# Patient Record
Sex: MALE | Race: WHITE | URBAN - METROPOLITAN AREA
[De-identification: names, ages, dates, MRNs, and addresses within clinical notes are randomized per-mention and may not be internally consistent; named-entity substitution may affect disease eponyms.]

---

## 2018-07-01 ASSESSMENT — MIFFLIN-ST. JEOR
SCORE: 1501.18
SCORE: 1501.18

## 2018-07-02 ASSESSMENT — MIFFLIN-ST. JEOR
SCORE: 1482.76
SCORE: 1482.76

## 2018-07-03 ENCOUNTER — SURGERY - HEALTHEAST (OUTPATIENT)
Dept: CARDIOLOGY | Facility: CLINIC | Age: 58
End: 2018-07-03

## 2018-07-03 ASSESSMENT — MIFFLIN-ST. JEOR
SCORE: 1496.65
SCORE: 1496.65

## 2018-07-04 ASSESSMENT — MIFFLIN-ST. JEOR
SCORE: 1492.11

## 2018-07-05 ENCOUNTER — ANESTHESIA - HEALTHEAST (OUTPATIENT)
Dept: SURGERY | Facility: CLINIC | Age: 58
End: 2018-07-05

## 2018-07-05 ASSESSMENT — MIFFLIN-ST. JEOR
SCORE: 1483.49
SCORE: 1483.49

## 2018-07-06 ENCOUNTER — SURGERY - HEALTHEAST (OUTPATIENT)
Dept: SURGERY | Facility: CLINIC | Age: 58
End: 2018-07-06

## 2018-07-06 ASSESSMENT — MIFFLIN-ST. JEOR
SCORE: 1483.49
SCORE: 1483.49

## 2018-07-07 ASSESSMENT — MIFFLIN-ST. JEOR
SCORE: 1516.15
SCORE: 1516.15

## 2018-07-08 ASSESSMENT — MIFFLIN-ST. JEOR
SCORE: 1529.76
SCORE: 1529.76

## 2018-07-09 ASSESSMENT — MIFFLIN-ST. JEOR
SCORE: 1517.97
SCORE: 1517.97

## 2018-07-10 ASSESSMENT — MIFFLIN-ST. JEOR
SCORE: 1517.51
SCORE: 1517.51

## 2018-07-11 ASSESSMENT — MIFFLIN-ST. JEOR
SCORE: 1486.67
SCORE: 1486.67

## 2018-08-10 ENCOUNTER — OFFICE VISIT - HEALTHEAST (OUTPATIENT)
Dept: CARDIOLOGY | Facility: CLINIC | Age: 58
End: 2018-08-10

## 2018-08-10 DIAGNOSIS — I48.0 PAROXYSMAL ATRIAL FIBRILLATION (H): ICD-10-CM

## 2018-08-10 DIAGNOSIS — I25.119 ATHEROSCLEROSIS OF NATIVE CORONARY ARTERY OF NATIVE HEART WITH ANGINA PECTORIS (H): ICD-10-CM

## 2018-08-10 DIAGNOSIS — I10 ESSENTIAL HYPERTENSION: ICD-10-CM

## 2018-08-10 DIAGNOSIS — Z95.1 S/P CABG X 3: ICD-10-CM

## 2018-08-21 ENCOUNTER — OFFICE VISIT - HEALTHEAST (OUTPATIENT)
Dept: CARDIOLOGY | Facility: CLINIC | Age: 58
End: 2018-08-21

## 2018-08-21 DIAGNOSIS — Z95.1 S/P CABG X 2: ICD-10-CM

## 2018-08-21 ASSESSMENT — MIFFLIN-ST. JEOR: SCORE: 1492.11

## 2018-08-24 ENCOUNTER — OFFICE VISIT - HEALTHEAST (OUTPATIENT)
Dept: CARDIOLOGY | Facility: CLINIC | Age: 58
End: 2018-08-24

## 2018-08-24 DIAGNOSIS — I10 ESSENTIAL HYPERTENSION: ICD-10-CM

## 2018-08-24 DIAGNOSIS — Z95.1 S/P CABG (CORONARY ARTERY BYPASS GRAFT): ICD-10-CM

## 2018-08-24 DIAGNOSIS — E78.00 PURE HYPERCHOLESTEROLEMIA: ICD-10-CM

## 2018-08-24 DIAGNOSIS — I73.9 PVD (PERIPHERAL VASCULAR DISEASE) (H): ICD-10-CM

## 2018-08-24 DIAGNOSIS — I25.10 CORONARY ARTERY DISEASE DUE TO CALCIFIED CORONARY LESION: ICD-10-CM

## 2018-08-24 DIAGNOSIS — I25.84 CORONARY ARTERY DISEASE DUE TO CALCIFIED CORONARY LESION: ICD-10-CM

## 2018-08-24 DIAGNOSIS — I50.32 CHRONIC DIASTOLIC HEART FAILURE (H): ICD-10-CM

## 2018-08-24 DIAGNOSIS — I48.0 PAROXYSMAL ATRIAL FIBRILLATION (H): ICD-10-CM

## 2018-08-24 ASSESSMENT — MIFFLIN-ST. JEOR: SCORE: 1492.11

## 2018-11-05 ENCOUNTER — COMMUNICATION - HEALTHEAST (OUTPATIENT)
Dept: ADMINISTRATIVE | Facility: CLINIC | Age: 58
End: 2018-11-05

## 2018-11-05 ENCOUNTER — RECORDS - HEALTHEAST (OUTPATIENT)
Dept: ADMINISTRATIVE | Facility: OTHER | Age: 58
End: 2018-11-05

## 2018-11-07 ENCOUNTER — AMBULATORY - HEALTHEAST (OUTPATIENT)
Dept: VASCULAR SURGERY | Facility: CLINIC | Age: 58
End: 2018-11-07

## 2018-11-07 DIAGNOSIS — I99.8 VASCULAR INSUFFICIENCY: ICD-10-CM

## 2018-11-08 ENCOUNTER — RECORDS - HEALTHEAST (OUTPATIENT)
Dept: ADMINISTRATIVE | Facility: OTHER | Age: 58
End: 2018-11-08

## 2018-11-09 ENCOUNTER — AMBULATORY - HEALTHEAST (OUTPATIENT)
Dept: VASCULAR SURGERY | Facility: CLINIC | Age: 58
End: 2018-11-09

## 2018-11-09 DIAGNOSIS — I73.9 PAD (PERIPHERAL ARTERY DISEASE) (H): ICD-10-CM

## 2018-11-12 ENCOUNTER — AMBULATORY - HEALTHEAST (OUTPATIENT)
Dept: VASCULAR SURGERY | Facility: CLINIC | Age: 58
End: 2018-11-12

## 2018-11-12 ENCOUNTER — RECORDS - HEALTHEAST (OUTPATIENT)
Dept: VASCULAR ULTRASOUND | Facility: CLINIC | Age: 58
End: 2018-11-12

## 2018-11-12 ENCOUNTER — OFFICE VISIT - HEALTHEAST (OUTPATIENT)
Dept: VASCULAR SURGERY | Facility: CLINIC | Age: 58
End: 2018-11-12

## 2018-11-12 DIAGNOSIS — I73.9 PERIPHERAL VASCULAR DISEASE, UNSPECIFIED (H): ICD-10-CM

## 2018-11-12 DIAGNOSIS — Z72.0 TOBACCO ABUSE: ICD-10-CM

## 2018-11-12 DIAGNOSIS — E78.5 HYPERLIPIDEMIA LDL GOAL <70: ICD-10-CM

## 2018-11-12 DIAGNOSIS — I70.229 CRITICAL LOWER LIMB ISCHEMIA (H): ICD-10-CM

## 2018-11-12 DIAGNOSIS — I73.9 PVD (PERIPHERAL VASCULAR DISEASE) (H): ICD-10-CM

## 2018-11-12 DIAGNOSIS — I73.9 PAD (PERIPHERAL ARTERY DISEASE) (H): ICD-10-CM

## 2018-11-14 ENCOUNTER — COMMUNICATION - HEALTHEAST (OUTPATIENT)
Dept: VASCULAR SURGERY | Facility: CLINIC | Age: 58
End: 2018-11-14

## 2018-11-19 ENCOUNTER — AMBULATORY - HEALTHEAST (OUTPATIENT)
Dept: VASCULAR SURGERY | Facility: CLINIC | Age: 58
End: 2018-11-19

## 2018-11-19 DIAGNOSIS — I73.9 PVD (PERIPHERAL VASCULAR DISEASE) (H): ICD-10-CM

## 2018-11-21 ENCOUNTER — RECORDS - HEALTHEAST (OUTPATIENT)
Dept: ADMINISTRATIVE | Facility: OTHER | Age: 58
End: 2018-11-21

## 2018-11-21 ENCOUNTER — COMMUNICATION - HEALTHEAST (OUTPATIENT)
Dept: VASCULAR SURGERY | Facility: CLINIC | Age: 58
End: 2018-11-21

## 2018-11-23 ENCOUNTER — COMMUNICATION - HEALTHEAST (OUTPATIENT)
Dept: VASCULAR SURGERY | Facility: CLINIC | Age: 58
End: 2018-11-23

## 2018-11-26 ENCOUNTER — APPOINTMENT (OUTPATIENT)
Dept: SURGERY | Facility: PHYSICIAN GROUP | Age: 58
End: 2018-11-26

## 2019-02-04 ENCOUNTER — OFFICE VISIT - HEALTHEAST (OUTPATIENT)
Dept: VASCULAR SURGERY | Facility: CLINIC | Age: 59
End: 2019-02-04

## 2019-02-04 ENCOUNTER — RECORDS - HEALTHEAST (OUTPATIENT)
Dept: VASCULAR ULTRASOUND | Facility: CLINIC | Age: 59
End: 2019-02-04

## 2019-02-04 DIAGNOSIS — I73.9 PVD (PERIPHERAL VASCULAR DISEASE) (H): ICD-10-CM

## 2019-02-04 DIAGNOSIS — I73.9 PERIPHERAL VASCULAR DISEASE, UNSPECIFIED (H): ICD-10-CM

## 2019-02-21 ENCOUNTER — RECORDS - HEALTHEAST (OUTPATIENT)
Dept: ADMINISTRATIVE | Facility: OTHER | Age: 59
End: 2019-02-21

## 2019-05-20 ENCOUNTER — RECORDS - HEALTHEAST (OUTPATIENT)
Dept: VASCULAR ULTRASOUND | Facility: CLINIC | Age: 59
End: 2019-05-20

## 2019-05-20 ENCOUNTER — OFFICE VISIT - HEALTHEAST (OUTPATIENT)
Dept: VASCULAR SURGERY | Facility: CLINIC | Age: 59
End: 2019-05-20

## 2019-05-20 DIAGNOSIS — I73.9 PERIPHERAL VASCULAR DISEASE, UNSPECIFIED (H): ICD-10-CM

## 2019-05-20 DIAGNOSIS — I73.9 PVD (PERIPHERAL VASCULAR DISEASE) (H): ICD-10-CM

## 2019-07-31 ENCOUNTER — HOSPITAL ENCOUNTER (INPATIENT)
Facility: CLINIC | Age: 59
LOS: 1 days | Discharge: NURSING, REHAB OR LONG-TERM CARE | DRG: 300 | End: 2019-08-07
Attending: EMERGENCY MEDICINE | Admitting: INTERNAL MEDICINE
Payer: COMMERCIAL

## 2019-07-31 DIAGNOSIS — F91.9 DISRUPTIVE BEHAVIOR: ICD-10-CM

## 2019-07-31 DIAGNOSIS — M79.672 LEFT FOOT PAIN: ICD-10-CM

## 2019-07-31 DIAGNOSIS — Z79.4 TYPE 2 DIABETES MELLITUS WITH OTHER CIRCULATORY COMPLICATION, WITH LONG-TERM CURRENT USE OF INSULIN (H): ICD-10-CM

## 2019-07-31 DIAGNOSIS — R46.89 AGGRESSIVE BEHAVIOR: ICD-10-CM

## 2019-07-31 DIAGNOSIS — E11.59 TYPE 2 DIABETES MELLITUS WITH OTHER CIRCULATORY COMPLICATION, WITH LONG-TERM CURRENT USE OF INSULIN (H): ICD-10-CM

## 2019-07-31 DIAGNOSIS — F10.920 ALCOHOLIC INTOXICATION WITHOUT COMPLICATION (H): ICD-10-CM

## 2019-07-31 DIAGNOSIS — Z72.0 TOBACCO ABUSE DISORDER: Primary | ICD-10-CM

## 2019-07-31 LAB
ALBUMIN SERPL-MCNC: 3.2 G/DL (ref 3.4–5)
ALCOHOL BREATH TEST: 0.16 (ref 0–0.01)
ALP SERPL-CCNC: 122 U/L (ref 40–150)
ALT SERPL W P-5'-P-CCNC: 52 U/L (ref 0–70)
ANION GAP SERPL CALCULATED.3IONS-SCNC: 8 MMOL/L (ref 3–14)
AST SERPL W P-5'-P-CCNC: 86 U/L (ref 0–45)
BASOPHILS # BLD AUTO: 0 10E9/L (ref 0–0.2)
BASOPHILS NFR BLD AUTO: 0.3 %
BILIRUB SERPL-MCNC: 0.2 MG/DL (ref 0.2–1.3)
BUN SERPL-MCNC: 14 MG/DL (ref 7–30)
CALCIUM SERPL-MCNC: 8.1 MG/DL (ref 8.5–10.1)
CHLORIDE SERPL-SCNC: 107 MMOL/L (ref 94–109)
CO2 SERPL-SCNC: 26 MMOL/L (ref 20–32)
CREAT SERPL-MCNC: 0.68 MG/DL (ref 0.66–1.25)
DIFFERENTIAL METHOD BLD: ABNORMAL
EOSINOPHIL # BLD AUTO: 0 10E9/L (ref 0–0.7)
EOSINOPHIL NFR BLD AUTO: 0.3 %
ERYTHROCYTE [DISTWIDTH] IN BLOOD BY AUTOMATED COUNT: 13.8 % (ref 10–15)
GFR SERPL CREATININE-BSD FRML MDRD: >90 ML/MIN/{1.73_M2}
GLUCOSE BLDC GLUCOMTR-MCNC: 105 MG/DL (ref 70–99)
GLUCOSE BLDC GLUCOMTR-MCNC: 67 MG/DL (ref 70–99)
GLUCOSE SERPL-MCNC: 127 MG/DL (ref 70–99)
HCT VFR BLD AUTO: 42.1 % (ref 40–53)
HGB BLD-MCNC: 14.7 G/DL (ref 13.3–17.7)
IMM GRANULOCYTES # BLD: 0 10E9/L (ref 0–0.4)
IMM GRANULOCYTES NFR BLD: 0.2 %
INR PPP: 2.2 (ref 0.86–1.14)
LYMPHOCYTES # BLD AUTO: 1.9 10E9/L (ref 0.8–5.3)
LYMPHOCYTES NFR BLD AUTO: 31.4 %
MCH RBC QN AUTO: 35.9 PG (ref 26.5–33)
MCHC RBC AUTO-ENTMCNC: 34.9 G/DL (ref 31.5–36.5)
MCV RBC AUTO: 103 FL (ref 78–100)
MONOCYTES # BLD AUTO: 0.3 10E9/L (ref 0–1.3)
MONOCYTES NFR BLD AUTO: 4.5 %
NEUTROPHILS # BLD AUTO: 3.8 10E9/L (ref 1.6–8.3)
NEUTROPHILS NFR BLD AUTO: 63.3 %
NRBC # BLD AUTO: 0 10*3/UL
NRBC BLD AUTO-RTO: 0 /100
PLATELET # BLD AUTO: 259 10E9/L (ref 150–450)
POTASSIUM SERPL-SCNC: 3.8 MMOL/L (ref 3.4–5.3)
PROT SERPL-MCNC: 7 G/DL (ref 6.8–8.8)
RBC # BLD AUTO: 4.1 10E12/L (ref 4.4–5.9)
SODIUM SERPL-SCNC: 141 MMOL/L (ref 133–144)
WBC # BLD AUTO: 6 10E9/L (ref 4–11)

## 2019-07-31 PROCEDURE — 25000128 H RX IP 250 OP 636: Performed by: INTERNAL MEDICINE

## 2019-07-31 PROCEDURE — 99285 EMERGENCY DEPT VISIT HI MDM: CPT

## 2019-07-31 PROCEDURE — 85610 PROTHROMBIN TIME: CPT | Performed by: INTERNAL MEDICINE

## 2019-07-31 PROCEDURE — 00000146 ZZHCL STATISTIC GLUCOSE BY METER IP

## 2019-07-31 PROCEDURE — 96374 THER/PROPH/DIAG INJ IV PUSH: CPT

## 2019-07-31 PROCEDURE — 99219 ZZC INITIAL OBSERVATION CARE,LEVL II: CPT | Performed by: INTERNAL MEDICINE

## 2019-07-31 PROCEDURE — 80053 COMPREHEN METABOLIC PANEL: CPT | Performed by: EMERGENCY MEDICINE

## 2019-07-31 PROCEDURE — 36415 COLL VENOUS BLD VENIPUNCTURE: CPT | Performed by: INTERNAL MEDICINE

## 2019-07-31 PROCEDURE — G0378 HOSPITAL OBSERVATION PER HR: HCPCS

## 2019-07-31 PROCEDURE — 25000132 ZZH RX MED GY IP 250 OP 250 PS 637: Performed by: INTERNAL MEDICINE

## 2019-07-31 PROCEDURE — 85025 COMPLETE CBC W/AUTO DIFF WBC: CPT | Performed by: EMERGENCY MEDICINE

## 2019-07-31 RX ORDER — WARFARIN SODIUM 4 MG/1
4 TABLET ORAL
Status: ON HOLD | COMMUNITY
End: 2019-08-07

## 2019-07-31 RX ORDER — ASPIRIN 81 MG/1
81 TABLET ORAL DAILY
Status: ON HOLD | COMMUNITY
End: 2019-08-07

## 2019-07-31 RX ORDER — SWAB
800 SWAB, NON-MEDICATED MISCELLANEOUS DAILY
COMMUNITY

## 2019-07-31 RX ORDER — POLYETHYLENE GLYCOL 3350 17 G/17G
17 POWDER, FOR SOLUTION ORAL DAILY PRN
Status: DISCONTINUED | OUTPATIENT
Start: 2019-07-31 | End: 2019-08-07 | Stop reason: HOSPADM

## 2019-07-31 RX ORDER — POLYETHYLENE GLYCOL 3350 17 G/17G
1 POWDER, FOR SOLUTION ORAL DAILY PRN
COMMUNITY

## 2019-07-31 RX ORDER — DEXTROSE MONOHYDRATE 25 G/50ML
25-50 INJECTION, SOLUTION INTRAVENOUS
Status: DISCONTINUED | OUTPATIENT
Start: 2019-07-31 | End: 2019-08-07 | Stop reason: HOSPADM

## 2019-07-31 RX ORDER — MAGNESIUM OXIDE 400 MG/1
1200 TABLET ORAL 2 TIMES DAILY
COMMUNITY

## 2019-07-31 RX ORDER — BUPROPION HYDROCHLORIDE 300 MG/1
300 TABLET ORAL EVERY MORNING
Status: DISCONTINUED | OUTPATIENT
Start: 2019-08-01 | End: 2019-08-07 | Stop reason: HOSPADM

## 2019-07-31 RX ORDER — ACETAMINOPHEN 325 MG/1
975 TABLET ORAL 3 TIMES DAILY
Status: DISCONTINUED | OUTPATIENT
Start: 2019-07-31 | End: 2019-08-07 | Stop reason: HOSPADM

## 2019-07-31 RX ORDER — ACETAMINOPHEN 650 MG/1
650 SUPPOSITORY RECTAL EVERY 4 HOURS PRN
Status: DISCONTINUED | OUTPATIENT
Start: 2019-07-31 | End: 2019-08-07 | Stop reason: HOSPADM

## 2019-07-31 RX ORDER — ONDANSETRON 2 MG/ML
4 INJECTION INTRAMUSCULAR; INTRAVENOUS EVERY 6 HOURS PRN
Status: DISCONTINUED | OUTPATIENT
Start: 2019-07-31 | End: 2019-08-07 | Stop reason: HOSPADM

## 2019-07-31 RX ORDER — ACETAMINOPHEN 325 MG/1
975 TABLET ORAL 3 TIMES DAILY
COMMUNITY

## 2019-07-31 RX ORDER — ONDANSETRON 4 MG/1
4 TABLET, ORALLY DISINTEGRATING ORAL EVERY 6 HOURS PRN
Status: DISCONTINUED | OUTPATIENT
Start: 2019-07-31 | End: 2019-08-07 | Stop reason: HOSPADM

## 2019-07-31 RX ORDER — BUPROPION HYDROCHLORIDE 300 MG/1
300 TABLET ORAL EVERY MORNING
COMMUNITY

## 2019-07-31 RX ORDER — NITROGLYCERIN 0.4 MG/1
0.4 TABLET SUBLINGUAL EVERY 5 MIN PRN
COMMUNITY

## 2019-07-31 RX ORDER — WARFARIN SODIUM 3 MG/1
3 TABLET ORAL
Status: ON HOLD | COMMUNITY
End: 2019-08-07

## 2019-07-31 RX ORDER — TAMSULOSIN HYDROCHLORIDE 0.4 MG/1
0.4 CAPSULE ORAL EVERY MORNING
Status: DISCONTINUED | OUTPATIENT
Start: 2019-08-01 | End: 2019-08-07 | Stop reason: HOSPADM

## 2019-07-31 RX ORDER — NALOXONE HYDROCHLORIDE 0.4 MG/ML
.1-.4 INJECTION, SOLUTION INTRAMUSCULAR; INTRAVENOUS; SUBCUTANEOUS
Status: DISCONTINUED | OUTPATIENT
Start: 2019-07-31 | End: 2019-08-07 | Stop reason: HOSPADM

## 2019-07-31 RX ORDER — AMOXICILLIN 250 MG
1 CAPSULE ORAL 2 TIMES DAILY PRN
COMMUNITY

## 2019-07-31 RX ORDER — TAMSULOSIN HYDROCHLORIDE 0.4 MG/1
0.4 CAPSULE ORAL EVERY MORNING
COMMUNITY

## 2019-07-31 RX ORDER — ACETAMINOPHEN 325 MG/1
650 TABLET ORAL EVERY 4 HOURS PRN
Status: DISCONTINUED | OUTPATIENT
Start: 2019-07-31 | End: 2019-08-07 | Stop reason: HOSPADM

## 2019-07-31 RX ORDER — GABAPENTIN 100 MG/1
CAPSULE ORAL 3 TIMES DAILY
Status: ON HOLD | COMMUNITY
End: 2019-08-07

## 2019-07-31 RX ORDER — METOPROLOL TARTRATE 25 MG/1
25 TABLET, FILM COATED ORAL 2 TIMES DAILY
Status: DISCONTINUED | OUTPATIENT
Start: 2019-07-31 | End: 2019-08-07 | Stop reason: HOSPADM

## 2019-07-31 RX ORDER — DULOXETIN HYDROCHLORIDE 60 MG/1
60 CAPSULE, DELAYED RELEASE ORAL DAILY
Status: DISCONTINUED | OUTPATIENT
Start: 2019-08-01 | End: 2019-08-07 | Stop reason: HOSPADM

## 2019-07-31 RX ORDER — DULOXETIN HYDROCHLORIDE 60 MG/1
60 CAPSULE, DELAYED RELEASE ORAL DAILY
COMMUNITY

## 2019-07-31 RX ORDER — NITROGLYCERIN 0.4 MG/1
0.4 TABLET SUBLINGUAL EVERY 5 MIN PRN
Status: DISCONTINUED | OUTPATIENT
Start: 2019-07-31 | End: 2019-08-07 | Stop reason: HOSPADM

## 2019-07-31 RX ORDER — MAGNESIUM OXIDE 400 MG/1
1200 TABLET ORAL 2 TIMES DAILY
Status: DISCONTINUED | OUTPATIENT
Start: 2019-07-31 | End: 2019-08-07 | Stop reason: HOSPADM

## 2019-07-31 RX ORDER — ASPIRIN 81 MG/1
81 TABLET ORAL DAILY
Status: DISCONTINUED | OUTPATIENT
Start: 2019-08-01 | End: 2019-08-07 | Stop reason: HOSPADM

## 2019-07-31 RX ORDER — ATORVASTATIN CALCIUM 40 MG/1
40 TABLET, FILM COATED ORAL AT BEDTIME
COMMUNITY

## 2019-07-31 RX ORDER — ATORVASTATIN CALCIUM 40 MG/1
40 TABLET, FILM COATED ORAL AT BEDTIME
Status: DISCONTINUED | OUTPATIENT
Start: 2019-07-31 | End: 2019-08-07 | Stop reason: HOSPADM

## 2019-07-31 RX ORDER — NICOTINE POLACRILEX 4 MG
15-30 LOZENGE BUCCAL
Status: DISCONTINUED | OUTPATIENT
Start: 2019-07-31 | End: 2019-08-07 | Stop reason: HOSPADM

## 2019-07-31 RX ORDER — KETOROLAC TROMETHAMINE 30 MG/ML
30 INJECTION, SOLUTION INTRAMUSCULAR; INTRAVENOUS EVERY 6 HOURS
Status: DISCONTINUED | OUTPATIENT
Start: 2019-07-31 | End: 2019-08-02

## 2019-07-31 RX ORDER — METOPROLOL TARTRATE 25 MG/1
25 TABLET, FILM COATED ORAL 2 TIMES DAILY
COMMUNITY

## 2019-07-31 RX ORDER — GABAPENTIN 100 MG/1
100 CAPSULE ORAL 3 TIMES DAILY
Status: DISCONTINUED | OUTPATIENT
Start: 2019-07-31 | End: 2019-08-03

## 2019-07-31 RX ADMIN — ATORVASTATIN CALCIUM 40 MG: 40 TABLET, FILM COATED ORAL at 23:11

## 2019-07-31 RX ADMIN — GABAPENTIN 100 MG: 100 CAPSULE ORAL at 23:11

## 2019-07-31 RX ADMIN — ACETAMINOPHEN 975 MG: 325 TABLET, FILM COATED ORAL at 23:11

## 2019-07-31 RX ADMIN — KETOROLAC TROMETHAMINE 30 MG: 30 INJECTION, SOLUTION INTRAMUSCULAR at 23:10

## 2019-07-31 RX ADMIN — METOPROLOL TARTRATE 25 MG: 25 TABLET, FILM COATED ORAL at 23:11

## 2019-07-31 ASSESSMENT — ENCOUNTER SYMPTOMS: AGITATION: 1

## 2019-07-31 NOTE — ED PROVIDER NOTES
History     Chief Complaint:  Aggressive Behavior    HPI   Placido Hinton is a 59 year old male who presents to the ED for the evaluation of aggressive behavior. The patient has lived at [facility] for the last 1.5 years. The nursing home states that the patient was throwing urinals at staff, throwing rocks at the window, and that he was threatening staff today. They called EMS. Here, the patient allegedly states that he threw the urinals at staff because they didn't empty them for 3 days.Today, the patient reports that he has been drinking alcohol. He denies a history of withdrawal issues or thoughts of hurting self or others.    Allergies:  Penicillins    Past Medical History:    Type 2 diabetes  No history of withdrawal issues    Past Surgical History:    Right above knee amputation    Family History:    Family history reviewed. No pertinent family history.     Social History:  Smoking Status: Current every day smoker  Alcohol Use: Positive  Marital Status:  Single      Review of Systems   Musculoskeletal:        Left foot pain   Psychiatric/Behavioral: Positive for agitation. Negative for suicidal ideas.        No homicidal ideas   All other systems reviewed and are negative.      Physical Exam     Patient Vitals for the past 24 hrs:   BP Temp Temp src Pulse Resp SpO2   07/31/19 2241 (!) 165/106 98  F (36.7  C) Oral 84 -- 98 %   07/31/19 2135 114/79 -- -- 101 16 97 %   07/31/19 1732 113/80 98.2  F (36.8  C) Oral 89 16 99 %      Physical Exam  General: Patient in mild distress.  Alert and cooperative with exam. Moderately intoxicated  HEENT: NC/AT. Conjunctiva without injection or scleral icterus. External ears normal.  Respiratory: Breathing comfortably on room air  CV: Normal rate, all extremities well perfused  GI:  Non-distended abdomen  Skin: Warm, dry, no rashes/open wounds on exposed skin  Musculoskeletal: Right AKA. Left great toe amputation  Neuro: Alert, answers questions appropriately. No  gross motor deficits  Psychiatric: History of alcohol abuse. Denies SI/HI    Emergency Department Course     Laboratory:  Laboratory findings were communicated with the patient who voiced understanding of the findings.    CBC: WBC 6.0, HGB 14.7,   CMP: glucose 127 (H), calcium 8.1 (L), albumin 3.2 (L), AST 86 (H), o/w WNL (creatinine: 0.68)  Alcohol breath test: 0.158 (A)    Emergency Department Course:    1720 Nursing notes and vitals reviewed.    1725 I performed an exam of the patient as documented above.     1850 IV was inserted and blood was drawn for laboratory testing, results above.     2032 I spoke with Dr. Hicks of the hospitalist service from [facility] regarding patient's presentation, findings, and plan of care.     2231 Patient admitted.    Impression & Plan      Medical Decision Making:  Patient is a 59-year-old male presents from skilled care facility for disruptive/agitated behavior and alcohol intoxication.  Patient's medical history and records were reviewed.  On evaluation, patient is calm and cooperative and without medical complaint.  He was noted to be intoxicated (alcohol breath test 0.158) and have mildly elevated AST as noted above.  Patient's abdominal exam was benign.  Patient's skilled care facility sent a letter to the emergency department stating that he would not be allowed back to the facility until approved by the facility administration; the timeline for this is unclear.  Patient will be admitted to observation with the hospitalist service pending placement.  No emergent medical conditions were identified during patient's ED encounter.     Diagnosis:    ICD-10-CM    1. Alcoholic intoxication without complication (H) F10.920 INR     Glucose by meter     Glucose by meter     Glucose by meter     Glucose by meter     Glucose by meter     Glucose by meter   2. Disruptive behavior F91.9      Disposition:   The patient is admitted into the care of Dr. Hicks.     Destiney  Disclosure:  I, Flip Mcdaniel, am serving as a scribe at 5:35 PM on 7/31/2019 to document services personally performed by William Miranda DO based on my observations and the provider's statements to me.   EMERGENCY DEPARTMENT       William Miranda DO  08/01/19 0317

## 2019-07-31 NOTE — ED NOTES
Bed: BH3  Expected date:   Expected time:   Means of arrival:   Comments:  511  59 M etoh/restrained  1700

## 2019-07-31 NOTE — ED NOTES
Alissa from [facility] called and stated the patient has been using a phone jose to have alcohol delivered to him.  Been increasingly aggressive and is not allowed back.  She states they will try finding a rehab for him tomorrow and sort out living arrangements.  Call back for Alissa [phone].  She states we can ask for her or Tonya or Andra.  I relayed this to the MD

## 2019-07-31 NOTE — ED TRIAGE NOTES
"Lives at [facility] and drunk.  Got upset and threw full urinals at staff, making threats to kill others, and throwing rocks at the windows.  PD found 2 750ml bottles of vodka under his wheelchair, and confiscated them.  Patient came in on restraints stating \"Don't fuck with me\".  I completed my assessment and he calmed down.  He stated there is a lot of negligence at the nursing home, and hadn't emptied his urinals in days.  He stated, \"My room was disgusting and smelly.  I have to live in a place where people don't care\".  He contracted for safety here and stated he will not harm people here.  He states, \"You guys are cool and I have no problems with you\".  Security and I took him out of restraints from EMS.   "

## 2019-08-01 ENCOUNTER — APPOINTMENT (OUTPATIENT)
Dept: ULTRASOUND IMAGING | Facility: CLINIC | Age: 59
DRG: 300 | End: 2019-08-01
Attending: HOSPITALIST
Payer: COMMERCIAL

## 2019-08-01 ENCOUNTER — APPOINTMENT (OUTPATIENT)
Dept: ULTRASOUND IMAGING | Facility: CLINIC | Age: 59
DRG: 300 | End: 2019-08-01
Attending: STUDENT IN AN ORGANIZED HEALTH CARE EDUCATION/TRAINING PROGRAM
Payer: COMMERCIAL

## 2019-08-01 LAB
GLUCOSE BLDC GLUCOMTR-MCNC: 124 MG/DL (ref 70–99)
GLUCOSE BLDC GLUCOMTR-MCNC: 135 MG/DL (ref 70–99)
GLUCOSE BLDC GLUCOMTR-MCNC: 161 MG/DL (ref 70–99)
GLUCOSE BLDC GLUCOMTR-MCNC: 263 MG/DL (ref 70–99)
GLUCOSE BLDC GLUCOMTR-MCNC: 48 MG/DL (ref 70–99)
GLUCOSE BLDC GLUCOMTR-MCNC: 74 MG/DL (ref 70–99)
GLUCOSE BLDC GLUCOMTR-MCNC: 82 MG/DL (ref 70–99)
GLUCOSE BLDC GLUCOMTR-MCNC: 83 MG/DL (ref 70–99)
GLUCOSE BLDC GLUCOMTR-MCNC: 98 MG/DL (ref 70–99)
GLUCOSE BLDC GLUCOMTR-MCNC: 98 MG/DL (ref 70–99)
GLUCOSE SERPL-MCNC: 69 MG/DL (ref 70–99)
INR PPP: 2.18 (ref 0.86–1.14)

## 2019-08-01 PROCEDURE — 36415 COLL VENOUS BLD VENIPUNCTURE: CPT | Performed by: INTERNAL MEDICINE

## 2019-08-01 PROCEDURE — G0378 HOSPITAL OBSERVATION PER HR: HCPCS

## 2019-08-01 PROCEDURE — 85610 PROTHROMBIN TIME: CPT | Performed by: INTERNAL MEDICINE

## 2019-08-01 PROCEDURE — 82947 ASSAY GLUCOSE BLOOD QUANT: CPT | Performed by: INTERNAL MEDICINE

## 2019-08-01 PROCEDURE — 99207 ZZC CDG-CODE CATEGORY CHANGED: CPT | Performed by: HOSPITALIST

## 2019-08-01 PROCEDURE — 96376 TX/PRO/DX INJ SAME DRUG ADON: CPT

## 2019-08-01 PROCEDURE — 25000132 ZZH RX MED GY IP 250 OP 250 PS 637: Performed by: HOSPITALIST

## 2019-08-01 PROCEDURE — 99225 ZZC SUBSEQUENT OBSERVATION CARE,LEVEL II: CPT | Performed by: HOSPITALIST

## 2019-08-01 PROCEDURE — 25000128 H RX IP 250 OP 636: Performed by: INTERNAL MEDICINE

## 2019-08-01 PROCEDURE — 25000131 ZZH RX MED GY IP 250 OP 636 PS 637: Performed by: INTERNAL MEDICINE

## 2019-08-01 PROCEDURE — 93922 UPR/L XTREMITY ART 2 LEVELS: CPT

## 2019-08-01 PROCEDURE — 25000132 ZZH RX MED GY IP 250 OP 250 PS 637: Performed by: INTERNAL MEDICINE

## 2019-08-01 PROCEDURE — 00000146 ZZHCL STATISTIC GLUCOSE BY METER IP

## 2019-08-01 PROCEDURE — 25000131 ZZH RX MED GY IP 250 OP 636 PS 637: Performed by: HOSPITALIST

## 2019-08-01 PROCEDURE — 96372 THER/PROPH/DIAG INJ SC/IM: CPT

## 2019-08-01 PROCEDURE — 93926 LOWER EXTREMITY STUDY: CPT | Mod: LT

## 2019-08-01 RX ORDER — HYDROMORPHONE HYDROCHLORIDE 1 MG/ML
.3-.5 INJECTION, SOLUTION INTRAMUSCULAR; INTRAVENOUS; SUBCUTANEOUS EVERY 4 HOURS PRN
Status: DISCONTINUED | OUTPATIENT
Start: 2019-08-01 | End: 2019-08-01

## 2019-08-01 RX ORDER — DIAZEPAM 5 MG
10 TABLET ORAL EVERY 30 MIN PRN
Status: DISCONTINUED | OUTPATIENT
Start: 2019-08-01 | End: 2019-08-04

## 2019-08-01 RX ORDER — MULTIPLE VITAMINS W/ MINERALS TAB 9MG-400MCG
1 TAB ORAL DAILY
Status: DISCONTINUED | OUTPATIENT
Start: 2019-08-01 | End: 2019-08-07 | Stop reason: HOSPADM

## 2019-08-01 RX ORDER — LANOLIN ALCOHOL/MO/W.PET/CERES
100 CREAM (GRAM) TOPICAL DAILY
Status: COMPLETED | OUTPATIENT
Start: 2019-08-01 | End: 2019-08-05

## 2019-08-01 RX ORDER — HYDRALAZINE HYDROCHLORIDE 20 MG/ML
10 INJECTION INTRAMUSCULAR; INTRAVENOUS EVERY 4 HOURS PRN
Status: DISCONTINUED | OUTPATIENT
Start: 2019-08-01 | End: 2019-08-07 | Stop reason: HOSPADM

## 2019-08-01 RX ORDER — WARFARIN SODIUM 4 MG/1
4 TABLET ORAL
Status: COMPLETED | OUTPATIENT
Start: 2019-08-01 | End: 2019-08-01

## 2019-08-01 RX ORDER — WARFARIN SODIUM 4 MG/1
4 TABLET ORAL ONCE
Status: COMPLETED | OUTPATIENT
Start: 2019-08-01 | End: 2019-08-01

## 2019-08-01 RX ORDER — DIAZEPAM 10 MG/2ML
5-10 INJECTION, SOLUTION INTRAMUSCULAR; INTRAVENOUS EVERY 30 MIN PRN
Status: DISCONTINUED | OUTPATIENT
Start: 2019-08-01 | End: 2019-08-04

## 2019-08-01 RX ORDER — FOLIC ACID 1 MG/1
1 TABLET ORAL DAILY
Status: DISCONTINUED | OUTPATIENT
Start: 2019-08-01 | End: 2019-08-07 | Stop reason: HOSPADM

## 2019-08-01 RX ADMIN — ACETAMINOPHEN 975 MG: 325 TABLET, FILM COATED ORAL at 21:33

## 2019-08-01 RX ADMIN — MULTIPLE VITAMINS W/ MINERALS TAB 1 TABLET: TAB at 14:49

## 2019-08-01 RX ADMIN — ACETAMINOPHEN 975 MG: 325 TABLET, FILM COATED ORAL at 08:01

## 2019-08-01 RX ADMIN — HYDROMORPHONE HYDROCHLORIDE 1 MG: 2 TABLET ORAL at 21:33

## 2019-08-01 RX ADMIN — TAMSULOSIN HYDROCHLORIDE 0.4 MG: 0.4 CAPSULE ORAL at 08:01

## 2019-08-01 RX ADMIN — GABAPENTIN 100 MG: 100 CAPSULE ORAL at 21:35

## 2019-08-01 RX ADMIN — WARFARIN SODIUM 4 MG: 4 TABLET ORAL at 17:38

## 2019-08-01 RX ADMIN — INSULIN ASPART 18 UNITS: 100 INJECTION, SOLUTION INTRAVENOUS; SUBCUTANEOUS at 17:43

## 2019-08-01 RX ADMIN — BUPROPION HYDROCHLORIDE 300 MG: 300 TABLET, FILM COATED, EXTENDED RELEASE ORAL at 08:01

## 2019-08-01 RX ADMIN — KETOROLAC TROMETHAMINE 30 MG: 30 INJECTION, SOLUTION INTRAMUSCULAR at 21:45

## 2019-08-01 RX ADMIN — METOPROLOL TARTRATE 25 MG: 25 TABLET, FILM COATED ORAL at 08:01

## 2019-08-01 RX ADMIN — KETOROLAC TROMETHAMINE 30 MG: 30 INJECTION, SOLUTION INTRAMUSCULAR at 16:52

## 2019-08-01 RX ADMIN — KETOROLAC TROMETHAMINE 30 MG: 30 INJECTION, SOLUTION INTRAMUSCULAR at 04:24

## 2019-08-01 RX ADMIN — METOPROLOL TARTRATE 25 MG: 25 TABLET, FILM COATED ORAL at 21:37

## 2019-08-01 RX ADMIN — GABAPENTIN 100 MG: 100 CAPSULE ORAL at 08:01

## 2019-08-01 RX ADMIN — INSULIN GLARGINE 30 UNITS: 100 INJECTION, SOLUTION SUBCUTANEOUS at 00:38

## 2019-08-01 RX ADMIN — ACETAMINOPHEN 975 MG: 325 TABLET, FILM COATED ORAL at 16:51

## 2019-08-01 RX ADMIN — MAGNESIUM OXIDE TAB 400 MG (241.3 MG ELEMENTAL MG) 1200 MG: 400 (241.3 MG) TAB at 08:01

## 2019-08-01 RX ADMIN — MAGNESIUM OXIDE TAB 400 MG (241.3 MG ELEMENTAL MG) 1200 MG: 400 (241.3 MG) TAB at 00:38

## 2019-08-01 RX ADMIN — HYDROMORPHONE HYDROCHLORIDE 1 MG: 2 TABLET ORAL at 17:24

## 2019-08-01 RX ADMIN — KETOROLAC TROMETHAMINE 30 MG: 30 INJECTION, SOLUTION INTRAMUSCULAR at 10:16

## 2019-08-01 RX ADMIN — GABAPENTIN 100 MG: 100 CAPSULE ORAL at 16:51

## 2019-08-01 RX ADMIN — HYDROMORPHONE HYDROCHLORIDE 1 MG: 2 TABLET ORAL at 13:42

## 2019-08-01 RX ADMIN — DULOXETINE HYDROCHLORIDE 60 MG: 60 CAPSULE, DELAYED RELEASE ORAL at 08:01

## 2019-08-01 RX ADMIN — Medication 100 MG: at 14:49

## 2019-08-01 RX ADMIN — ATORVASTATIN CALCIUM 40 MG: 40 TABLET, FILM COATED ORAL at 21:35

## 2019-08-01 RX ADMIN — MAGNESIUM OXIDE TAB 400 MG (241.3 MG ELEMENTAL MG) 1200 MG: 400 (241.3 MG) TAB at 21:34

## 2019-08-01 RX ADMIN — WARFARIN SODIUM 4 MG: 4 TABLET ORAL at 00:38

## 2019-08-01 RX ADMIN — ASPIRIN 81 MG: 81 TABLET, COATED ORAL at 08:01

## 2019-08-01 RX ADMIN — INSULIN GLARGINE 5 UNITS: 100 INJECTION, SOLUTION SUBCUTANEOUS at 22:42

## 2019-08-01 RX ADMIN — DEXTROSE 30 G: 15 GEL ORAL at 02:13

## 2019-08-01 RX ADMIN — FOLIC ACID 1 MG: 1 TABLET ORAL at 14:49

## 2019-08-01 NOTE — PHARMACY-ANTICOAGULATION SERVICE
Clinical Pharmacy - Warfarin Dosing Consult     Pharmacy has been consulted to manage this patient s warfarin therapy.  Indication: (vascular dz)  Therapy Goal: INR 2-3  Warfarin Prior to Admission: Yes  Warfarin PTA Regimen: PTA dose 3mg daily except 4mg on Wednesdays    INR   Date Value Ref Range Status   07/31/2019 2.20 (H) 0.86 - 1.14 Final   01/26/2006 0.89 0.86 - 1.14 Final       Recommend warfarin 4 mg today.  Pharmacy will monitor Placido Hinton daily and order warfarin doses to achieve specified goal.      Please contact pharmacy as soon as possible if the warfarin needs to be held for a procedure or if the warfarin goals change.

## 2019-08-01 NOTE — H&P
Admitted:     07/31/2019      CHIEF COMPLAINT:  Aggression and leg pain.      HISTORY OF PRESENT ILLNESS:  Mr. Hinton is a 59-year-old gentleman who was hospitalized in March of this year with a cellulitis of his lower extremity who has since been residing in an assisted care facility, who was sent in for aggression and agitation.  This history is obtained both from the patient himself and discussion with staff.  The patient has a historical alcohol dependence, but had been in remission up until earlier this year.  Reportedly, he was ordering alcohol from an online service and having it delivered to his care facility.  He said that he was doing this because they were inadequately treating his pain.  His pain involves phantom pain of his right lower extremity where there was an above-the-knee amputation as well as in his right foot where there is a partial toe amputation.  Essentially, he has been getting Tylenol over the last couple of weeks.  He had been on gabapentin, but that was tapered off for unclear reasons.  The patient was sent in essentially for uncouth behavior and the facility indicated that they would not be able to take him back tonight.  He was evaluated in the emergency room by Dr. Miranda and found to have a largely normal metabolic profile with the exception of a slight elevation of AST and a blood alcohol level of 0.158.  The patient has been hemodynamically stable and cooperative.      REVIEW OF SYSTEMS:  The patient otherwise denies shortness of breath, fevers, chills, nausea, vomiting.  Otherwise, a 10-point review of systems is negative except as noted above in the history of present illness.      PAST MEDICAL HISTORY:   1.  Bipolar disorder.   2.  History of GERD.   3.  Distant history of deep venous thrombosis.  He is on chronic anticoagulation with warfarin.   4.  Hypertension.   5.  History of type 2 diabetes.   6.  History of traumatic brain injury.   7.  History of peripheral arterial  disease.  He had a right above-the-knee amputation and a left fem-pop bypass surgery with stenting.   8.  History of coronary artery disease, status post CABG in 2018.      SOCIAL HISTORY:  The patient smokes 5-7 cigarettes daily by his own admission.  Alcohol use as described in the HPI.      MEDICATIONS PRIOR TO ADMISSION:   1.  Wellbutrin.   2.  Cymbalta.   3.  Atorvastatin.   4.  Aspirin.   5.  Glargine insulin.   6.  Metoprolol.   7.  Flomax.   8.  Warfarin.      FAMILY HISTORY:  Reviewed and noncontributory.      PHYSICAL EXAMINATION:   VITAL SIGNS:  Blood pressure 113/80, respirations 16, pulse is 89, temperature is 98.   GENERAL:  The patient is visibly intoxicated, but otherwise cooperative.   HEENT:  Sclerae are anicteric.  Pupils are round and reactive.  Tongue and uvula are midline.   NEUROLOGIC:  There is no asterixis.  Equal  strength.   HEART:  Regular rate and rhythm.  No murmurs.   LUNGS:  Clear to auscultation bilaterally.   ABDOMEN:  Soft, nontender, nondistended.   EXTREMITIES:  He has a right above-the-knee amputation well-healed.  Left lower extremity, no substantial edema.  He has right partial toe amputation also appears well-healed.      LABORATORY DATA:  Sodium 141, potassium 3.8, creatinine 0.68.  White blood cell count 6.0, hemoglobin 14.7, platelets 259.  Blood alcohol level 0.158.      ASSESSMENT AND PLAN:  This is a 59-year-old gentleman presenting with aggressive behavior in the setting of inadequate pain control and increasing alcohol consumption.   1.  Aggressive behavior.  I suspect this is likely probably a personality trait exacerbated by recent alcohol use.  The patient acknowledged that he dumped his urine out on the floor because they did not empty it fast enough at his care facility.  He also voiced displeasure with them having discontinued the gabapentin and the only thing that he is on for pain is Tylenol.  He has been obtaining alcohol through delivery service.  At  this point, he is being admitted to observation essentially for safe disposition.   2.  Alcohol use.  I had a long discussion with the patient about this.  He was a former drinker and has increased his alcohol consumption rapidly over the last couple of weeks for this reason.  He does have signs of mild hepatitis with an elevated AST.  He denies a history of alcohol withdrawal and he is currently intoxicated, but not critically so.  I suggested speaking with a chemical dependency counselor or psychiatrist, however, the patient refuses.  At this point, he is competent to make his own decisions.   3.  Chronic pain.  The patient reported that Dilaudid was effective for him.  He is now only on Tylenol.  He has also previously been on gabapentin, which was tapered down for unclear reasons.  At this point, I will restart gabapentin and continue Tylenol.  I will give 2 scheduled doses of Toradol, although acknowledged that this is not a good long-term option.  I would like to avoid opiates in a patient that likely already has chemical dependency issues.   4.  History of DVT and peripheral vascular disease.  The patient is typically maintained on warfarin.  INR has not been checked.  We will have pharmacy dose warfarin.   5.  History of type 2 diabetes.  Last hemoglobin A1c in the 10-11 range.  We will continue his outpatient insulin schedule in addition to sliding scale insulin.      DISPOSITION:  The patient will be admitted to observation as he essentially needs a safe disposition at that point.  He is likely at risk for alcohol withdrawal, currently intoxicated..  He denies a history of it.  I will defer starting the alcohol withdrawal protocol at this point, but could reconsider pending his clinical course.  At this point, he currently refuses CD or psychiatric evaluation.      TOTAL TIME SPENT:  Greater than 50% of which involved counseling and coordination of care, is 60 minutes.         BHARGAV GU MD              D: 2019   T: 2019   MT: MARIZOL      Name:     RUTHY NATH   MRN:      [id number]        Account:      [id number]   :      1960        Admitted:     2019                   Document: [id number]

## 2019-08-01 NOTE — PROGRESS NOTES
Called re: insulin dose  Had episode of hypoglycemia this evening, given juice with improvement  Supposed to get 52 units of lantus  Not reliable po right now  Will give 30 units lantus this evening then resume PTA tomorrow evening    Michoacano Love M.D.  Hospitalist  Pager [phone]  Text Page

## 2019-08-01 NOTE — PROVIDER NOTIFICATION
Updated Hospitalist regarding pt  after giving 3 cups of orange juice. Hold 18 units of insulin for now per order.

## 2019-08-01 NOTE — PHARMACY-ADMISSION MEDICATION HISTORY
Admission medication history interview status for the 7/31/2019  admission is complete. See EPIC admission navigator for prior to admission medications     Medication history source reliability:Good    Actions taken by pharmacist (provider contacted, etc): Reviewed med list sent by the [facility].  They said he has refused all medications today but they said he had them yesterday.      Additional medication history information not noted on PTA med list :None     Medication reconciliation/reorder completed by provider prior to medication history? No    Time spent in this activity: 20 mins    Prior to Admission medications    Medication Sig Last Dose Taking? Auth Provider   acetaminophen (TYLENOL) 325 MG tablet Take 975 mg by mouth 3 times daily 7/30/2019 at Unknown time Yes Unknown, Entered By History   aspirin 81 MG EC tablet Take 81 mg by mouth daily 7/30/2019 at Unknown time Yes Unknown, Entered By History   atorvastatin (LIPITOR) 40 MG tablet Take 40 mg by mouth At Bedtime 7/30/2019 at Unknown time Yes Unknown, Entered By History   buPROPion (WELLBUTRIN XL) 300 MG 24 hr tablet Take 300 mg by mouth every morning 7/30/2019 at Unknown time Yes Unknown, Entered By History   DULoxetine (CYMBALTA) 60 MG capsule Take 60 mg by mouth daily 7/30/2019 at Unknown time Yes Unknown, Entered By History   gabapentin (NEURONTIN) 100 MG capsule Take by mouth 3 times daily Tapering off as follows:   7/31-8/6: 500mg TID  8/7-8/13: 400mg TID  8/14-8/20: 300mg TID  8/21-8/27: 200mg TID  8/28-9/3: 100mg tid then stop 7/30/2019 at Unknown time Yes Unknown, Entered By History   insulin aspart (NOVOLOG FLEXPEN) 100 UNIT/ML pen Inject 18 Units Subcutaneous 3 times daily (with meals) HOLD if BG <150 or if he does not eat 7/30/2019 at Unknown time Yes Unknown, Entered By History   insulin glargine (LANTUS PEN) 100 UNIT/ML pen Inject 52 Units Subcutaneous At Bedtime 7/30/2019 at Unknown time Yes Unknown, Entered By History    magnesium oxide (MAG-OX) 400 MG tablet Take 1,200 mg by mouth 2 times daily 7/30/2019 at Unknown time Yes Unknown, Entered By History   metoprolol tartrate (LOPRESSOR) 25 MG tablet Take 25 mg by mouth 2 times daily 7/30/2019 at Unknown time Yes Unknown, Entered By History   nitroGLYcerin (NITROSTAT) 0.4 MG sublingual tablet Place 0.4 mg under the tongue every 5 minutes as needed for chest pain For chest pain place 1 tablet under the tongue every 5 minutes for 3 doses. If symptoms persist 5 minutes after 1st dose call 911. prn Yes Unknown, Entered By History   polyethylene glycol (MIRALAX/GLYCOLAX) packet Take 1 packet by mouth daily as needed for constipation prn Yes Unknown, Entered By History   senna-docusate (SENOKOT-S/PERICOLACE) 8.6-50 MG tablet Take 1 tablet by mouth 2 times daily as needed for constipation prn Yes Unknown, Entered By History   tamsulosin (FLOMAX) 0.4 MG capsule Take 0.4 mg by mouth every morning 7/30/2019 at Unknown time Yes Unknown, Entered By History   vitamin D3 (CHOLECALCIFEROL) 400 units capsule Take 800 Units by mouth daily 7/30/2019 at Unknown time Yes Unknown, Entered By History   warfarin (COUMADIN) 3 MG tablet Take 3 mg by mouth Mon, Tues, Thurs, Fri, Sat, Sun 7/30/2019 Yes Unknown, Entered By History   warfarin (COUMADIN) 4 MG tablet Take 4 mg by mouth every 7 days Wednesday 7/24/2019 Yes Unknown, Entered By History       Arti Montoya, PharmD

## 2019-08-01 NOTE — ED NOTES
"[facility]  ED Nurse Handoff Report    ED Chief complaint: Aggressive Behavior      ED Diagnosis:   Final diagnoses:   Alcoholic intoxication without complication (H)       Code Status: not on file    Allergies:   Allergies   Allergen Reactions     Penicillins        Activity level - Baseline/Home:  Total Care due to right BKA  Activity Level - Current:   Total Care    Patient's Preferred language: English   Needed?: No    Isolation: No  Infection: Not Applicable  Bariatric?: No    Vital Signs:   Vitals:    07/31/19 1732 07/31/19 2135   BP: 113/80 114/79   Pulse: 89 101   Resp: 16 16   Temp: 98.2  F (36.8  C)    TempSrc: Oral    SpO2: 99% 97%       Cardiac Rhythm: ,        Pain level:      Is this patient confused?: No   Does this patient have a guardian?  No         If yes, is there guardianship documents in the Epic \"Code/ACP\" activity?  N/A         Guardian Notified?  N/A  Early - Suicide Severity Rating Scale Completed?  Yes  If yes, what color did the patient score?  White    Patient Report: Initial Complaint: aggressive behavior  Focused Assessment: pt. Comes from [facility]. Sent here due to pt. Being verbally and physically aggressive towards staff. He has thrown urinals with urine in them at staff. They also report pt. Has been ordering alcohol thru a delivery service and has been drinking. Admitted last etoh was around 1600. He is not allowed to return to this facility unless he goes to rehab prior to returning. Has right AKA and is wheelchair dependent. Pt. Has been cooperative with cares here.  Tests Performed: labs  Abnormal Results:   Results for orders placed or performed during the hospital encounter of 07/31/19   CBC with platelets + differential   Result Value Ref Range    WBC 6.0 4.0 - 11.0 10e9/L    RBC Count 4.10 (L) 4.4 - 5.9 10e12/L    Hemoglobin 14.7 13.3 - 17.7 g/dL    Hematocrit 42.1 40.0 - 53.0 %     (H) 78 - 100 fl    MCH 35.9 " (H) 26.5 - 33.0 pg    MCHC 34.9 31.5 - 36.5 g/dL    RDW 13.8 10.0 - 15.0 %    Platelet Count 259 150 - 450 10e9/L    Diff Method Automated Method     % Neutrophils 63.3 %    % Lymphocytes 31.4 %    % Monocytes 4.5 %    % Eosinophils 0.3 %    % Basophils 0.3 %    % Immature Granulocytes 0.2 %    Nucleated RBCs 0 0 /100    Absolute Neutrophil 3.8 1.6 - 8.3 10e9/L    Absolute Lymphocytes 1.9 0.8 - 5.3 10e9/L    Absolute Monocytes 0.3 0.0 - 1.3 10e9/L    Absolute Eosinophils 0.0 0.0 - 0.7 10e9/L    Absolute Basophils 0.0 0.0 - 0.2 10e9/L    Abs Immature Granulocytes 0.0 0 - 0.4 10e9/L    Absolute Nucleated RBC 0.0    Comprehensive metabolic panel   Result Value Ref Range    Sodium 141 133 - 144 mmol/L    Potassium 3.8 3.4 - 5.3 mmol/L    Chloride 107 94 - 109 mmol/L    Carbon Dioxide 26 20 - 32 mmol/L    Anion Gap 8 3 - 14 mmol/L    Glucose 127 (H) 70 - 99 mg/dL    Urea Nitrogen 14 7 - 30 mg/dL    Creatinine 0.68 0.66 - 1.25 mg/dL    GFR Estimate >90 >60 mL/min/[1.73_m2]    GFR Estimate If Black >90 >60 mL/min/[1.73_m2]    Calcium 8.1 (L) 8.5 - 10.1 mg/dL    Bilirubin Total 0.2 0.2 - 1.3 mg/dL    Albumin 3.2 (L) 3.4 - 5.0 g/dL    Protein Total 7.0 6.8 - 8.8 g/dL    Alkaline Phosphatase 122 40 - 150 U/L    ALT 52 0 - 70 U/L    AST 86 (H) 0 - 45 U/L   Alcohol breath test POCT   Result Value Ref Range    Alcohol Breath Test 0.158 (A) 0.00 - 0.01       Treatments provided:     Family Comments: none present    OBS brochure/video discussed/provided to patient/family: Yes              Name of person given brochure if not patient:               Relationship to patient:     ED Medications: Medications - No data to display    Drips infusing?:  No    For the majority of the shift this patient was Green.   Interventions performed were routine cares.    Severe Sepsis OR Septic Shock Diagnosis Present: No    To be done/followed up on inpatient unit:  physician orders    ED NURSE PHONE NUMBER: *[phone]

## 2019-08-01 NOTE — PROGRESS NOTES
[facility]    Medicine Progress Note - Hospitalist Service       Date of Admission:  7/31/2019  Assessment & Plan   This is a 59-year-old gentleman presenting with aggressive behavior in the setting of inadequate pain control and increasing alcohol consumption.     Left foot pain  Peripheral vascular disease  Followed with Dr Harvey as recently as 5/20/19 - history of right AKA and L fempop --> gangrenous L great toe now amputated. Smoker and uncontrolled diabetic.  - now reporting rest pain and phantom pain  - CHRISTA ordered, Vascular surgery consulted asap given his cool foot and I am unsure of the chronicity  - prn dilaudid available for now  - pta regimen continued with ASA, lipitor, cymbalta, and gabapentin.    Aggressive behavior.    Suspect this is likely probably a personality trait exacerbated by recent alcohol use.  The patient acknowledged that he dumped his urine out on the floor because they did not empty it fast enough at his care facility.  He also voiced displeasure with them having discontinued the gabapentin and the only thing that he is on for pain is Tylenol.  He has been obtaining alcohol through delivery service.   - initially admitted under observation with primary concern of establishing a safe disposition  - he is calm and cooperative this morning    Alcohol use.  Former drinker who has recently increased his alcohol intake via online delivery service to the [facility] because of his left leg/foot pain.  Mild hepatitis noted on labs. No history of withdrawal reported  - [facility] protocol  - decline chem dep or psychiatry involvement currently  - he is competent to make his own decisions.     Chronic pain.    The patient reported that Dilaudid was effective for him.  He is now only on Tylenol.  He has also previously been on gabapentin, which was tapered down for unclear reasons.  At this point, I will restart gabapentin and continue Tylenol.    - s/p toradol in ED  - will add prn  dilaudid for now as his LLE vascular disease appears to have progressed potentially    History of DVT     The patient is typically maintained on warfarin.  INR  We will have pharmacy dose warfarin.     History of type 2 diabetes.  Last hemoglobin A1c in the 10-11 range.    - PTA regimen 52 lantus nightly, 18 aspart cml, and sliding scale  - he is not eating much, hypoglycemic last night, now ok after multiple juices  - will add holding parameters to CML aspart, and decrease lantus to 25, somewhat arbitrarily - he got 30 last night and AM sugars were on low side    Diet: Moderate Consistent CHO Diet    DVT Prophylaxis: Warfarin  Sequeira Catheter: not present  Code Status: Full Code      Disposition Plan   Expected discharge: pending vascular eval  Entered: Kevin Rabago MD 08/01/2019, 11:47 AM       The patient's care was discussed with the Bedside Nurse and Patient.    Kevin Rabago MD  Hospitalist Service  [facility]    ______________________________________________________________________    Interval History   Seen and examined. Pleasant and cooperative. States he only threw his urinals because they weren't being emptied. No new complaints but left foot pain is persistent and severe. Some numbness ongoing. No fevers or chills. Resting but easily aroused to verbal cue.    Data reviewed today: I reviewed all medications, new labs and imaging results over the last 24 hours. I personally reviewed no images or EKG's today.    Physical Exam   Vital Signs: Temp: 98.6  F (37  C) Temp src: Oral BP: (!) 135/95 Pulse: 81 Heart Rate: 83 Resp: 15 SpO2: 98 % O2 Device: None (Room air)    Weight: 0 lbs 0 oz    Gen: NAD, pleasant  HEENT: Normocephalic, EOMI, MMM  Resp: no crackles,  no wheezes, no increased work of resp  CV: S1S2 heard, reg rhythm, reg rate, no pedal edema  Abdo: soft, nontender, nondistended, bowel sounds present  Ext: r AKA, L great toe amp site WNL, pedal pulses not palpable, cool to  touch, not extremely tender on palp  Neuro: AAOx3, CN grossly intact, no facial asymmetry      Data   Recent Labs   Lab 08/01/19  0643 07/31/19  2312 07/31/19  1850   WBC  --   --  6.0   HGB  --   --  14.7   MCV  --   --  103*   PLT  --   --  259   INR 2.18* 2.20*  --    NA  --   --  141   POTASSIUM  --   --  3.8   CHLORIDE  --   --  107   CO2  --   --  26   BUN  --   --  14   CR  --   --  0.68   ANIONGAP  --   --  8   CHRIS  --   --  8.1*   GLC 69*  --  127*   ALBUMIN  --   --  3.2*   PROTTOTAL  --   --  7.0   BILITOTAL  --   --  0.2   ALKPHOS  --   --  122   ALT  --   --  52   AST  --   --  86*     No results found for this or any previous visit (from the past 24 hour(s)).

## 2019-08-01 NOTE — PROGRESS NOTES
DANY  D: Called and left VM with Harpreet-wanted to know about the process of not allowing someone to return to a SNF if [facility] will not accept pt back. DANY has call into [facility] to see if they will accept pt back.   P: Will continue to follow for discharge planning    Kathie PUENTES, LGSW

## 2019-08-01 NOTE — CONSULTS
Care Transition Initial Assessment - SW     Met with: Patient    Active Problems:    Aggressive behavior       DATA  Lives With: facility resident   Who is your support system?: Facility resident(s)/Staff  Identified issues/concerns regarding health management: discharge needs     ASSESSMENT  Cognitive Status:  awake, alert and oriented  Concerns to be addressed: discharge needs  SW consulted to assist with discharge planning, emotional support and transportation arrangements.  Pt is a 59 yr old female who admitted on 7/31/19 with stated diagnosis of aggressive behavior.   Patient previously lived at the [facility] facility.  SW reviewed patient chart, discussed with medical team and spoke to patient re: discharge planning. Patient reports that he threw his full urinal at [facility] because they were not emptying it. Patient also dealing with unmanageable pain issues since being weaned off of his pain meds in early July.    Patient shared [place] His  helped place him at [facility] where he was getting rehab and being fitted for a right prosthetic leg. Patient shared that he then had a heart attack on July 6, 2018. Patient then informed that he had his left great toe amputated in Feb 2019, weaned of his pain meds until completely discontinuing them at beginning of July this year. Patient states he has only been receiving Tylenol for pain since then which 'has not been cutting it!'. Patient is working with Anita,  who has found an TABBY in [place] that may be avail in August with his stated goal to live independently again. Patient states he has only a friend or two  For outside support. No family involvement.    In speaking to Alissa, Dir of  at [facility], the DON will email a list of return criteria for patient. Per Alissa, facility is in beginning stages of the civil  commitment process for alcohol dependence and abuse.     PLAN  Financial costs for the patient includes TBD .  Patient given options and choices for discharge TBD .  Patient/family is agreeable to the plan?  Patient states willingness to return to previous LTC if accepted back.  Transportation/person available to transport on day of discharge  is [facility] transportation   Patient Goals and Preferences: Return to LTC.  Patient anticipates discharging to:  LTC.    SW to continue to follow and assist with discharge planning.

## 2019-08-01 NOTE — PROGRESS NOTES
RECEIVING UNIT ED HANDOFF REVIEW    ED Nurse Handoff Report was reviewed by: Krystin Raygoza on July 31, 2019 at 10:11 PM

## 2019-08-01 NOTE — PLAN OF CARE
Pt remains A/O. A2. Urinal. Mod CHO. Baseline neuropathy. PO dilaudid for pain. Continue to monitor.

## 2019-08-01 NOTE — PLAN OF CARE
Pt A/Ox4, VSS on RA, scheduled tylenol and Toradol for pain, Right AKA, left foot numbness, CIWA score of 1,  2 hypoglycemic episodes, both solved with oral intake, will continue to monitor

## 2019-08-01 NOTE — CONSULTS
[facility]    Vascular Surgery Consultation    Date of Admission:  7/31/2019    Assessment & Plan   Placido Hinton is a 59 year old male who was admitted on 7/31/2019. I was asked to see the patient for left foot pain.  No signs of acute limb ischemia.  Bypass graft patent with stable velocities compared to 5/2019 at [facility].  ABIs reasonable.    -no acute surgical intervention  -LLE bypass graft open  -pain more chronic in nature and likely multifactorial beyond soley vascular in etiology  -overall care per primary  -please call with questions, concerns, or changes in clinical course    Plan discussed and agreed upon by on call vascular attending, Dr. Vail.    Active Problems:    Aggressive behavior      Joshua Hughes MD    Chief Complaint   Leg leg pain, aggression    History is obtained from the patient    History of Present Illness   Placido Hinton is a 59 year old male with PMH of PAD, aggressive behavior, EtOH use, chronic pain, CAD s/p CABG, and DM who presents with left leg pain.  Patient notes acute on chronic nature of his leg pain.  Duration is for at least 6 months.  Described as achy and constant.  Localized to his entire foot.  Positioning does not affect his pain.  Denies tissue loss.    His PAD is significant for R AKA and L fempop complicated by occlusion and tissue loss (dry gangrene of the left great toe) s/p lytics at [facility] in 12/2018 s/p L great toe amp during the same hospitalization.  Patient recently seen by Dr. Harvey at [facility], where he was recovering well with a patent graft.    INR 2.18    Patient underwent re-imaging today    ABIs  HISTORY: Left leg, cool foot, history of vascular disease, status post  great toe amputation.     COMPARISON: None.     FINDINGS:  Left CHRISTA: 0.91.     Waveforms: Triphasic.                                                                      IMPRESSION: Left CHRISTA shows mild arterial insufficiency.    Graft duplex                                                                    IMPRESSION:   1. Patent left femoral to popliteal artery bypass.  2. Area of soft plaque noted within the mid graft without focal  narrowing.  3. Hypoechoic collection around the graft measuring 7.4 x 1.7 x 1.5  cm, may represent a hematoma.      Past Medical History   I have reviewed this patient's medical history and updated it with pertinent information if needed.   Past Medical History:   Diagnosis Date     Anxiety      Atrial fibrillation (H)      Bipolar 1 disorder (H)      CAD (coronary artery disease)      Chronic back pain      Chronic diastolic heart failure (H)      Compartment syndrome (H)      Diabetes (H)      DVT (deep vein thrombosis) in pregnancy (H)      Gastroesophageal reflux disease      GI bleeding      Gout      Hyperlipidemia      Hypertension      Insomnia      Neuropathy      PVD (peripheral vascular disease) (H)      Substance abuse (H)     etoh     TBI (traumatic brain injury) (H)      Uncomplicated asthma        Past Surgical History   I have reviewed this patient's surgical history and updated it with pertinent information if needed.  Past Surgical History:   Procedure Laterality Date     CARDIAC SURGERY      angiogram     COLONOSCOPY       FASCIOTOMY LOWER EXTREMITY       ORTHOPEDIC SURGERY      right AKA       Prior to Admission Medications   Prior to Admission Medications   Prescriptions Last Dose Informant Patient Reported? Taking?   DULoxetine (CYMBALTA) 60 MG capsule 7/30/2019 at Unknown time Nursing Home Yes Yes   Sig: Take 60 mg by mouth daily   acetaminophen (TYLENOL) 325 MG tablet 7/30/2019 at Unknown time Nursing Home Yes Yes   Sig: Take 975 mg by mouth 3 times daily   aspirin 81 MG EC tablet 7/30/2019 at Unknown time Nursing Home Yes Yes   Sig: Take 81 mg by mouth daily   atorvastatin (LIPITOR) 40 MG tablet 7/30/2019 at Unknown time Nursing Home Yes Yes   Sig: Take 40 mg by mouth At Bedtime   buPROPion (WELLBUTRIN XL) 300 MG 24 hr  tablet 7/30/2019 at Unknown time Nursing Home Yes Yes   Sig: Take 300 mg by mouth every morning   gabapentin (NEURONTIN) 100 MG capsule 7/30/2019 at Unknown time Nursing Home Yes Yes   Sig: Take by mouth 3 times daily Tapering off as follows:   7/31-8/6: 500mg TID  8/7-8/13: 400mg TID  8/14-8/20: 300mg TID  8/21-8/27: 200mg TID  8/28-9/3: 100mg tid then stop   insulin aspart (NOVOLOG FLEXPEN) 100 UNIT/ML pen 7/30/2019 at Unknown time Nursing Home Yes Yes   Sig: Inject 18 Units Subcutaneous 3 times daily (with meals) HOLD if BG <150 or if he does not eat   insulin glargine (LANTUS PEN) 100 UNIT/ML pen 7/30/2019 at Unknown time Nursing Home Yes Yes   Sig: Inject 52 Units Subcutaneous At Bedtime   magnesium oxide (MAG-OX) 400 MG tablet 7/30/2019 at Unknown time Nursing Home Yes Yes   Sig: Take 1,200 mg by mouth 2 times daily   metoprolol tartrate (LOPRESSOR) 25 MG tablet 7/30/2019 at Unknown time Nursing Home Yes Yes   Sig: Take 25 mg by mouth 2 times daily   nitroGLYcerin (NITROSTAT) 0.4 MG sublingual tablet prn Nursing Home Yes Yes   Sig: Place 0.4 mg under the tongue every 5 minutes as needed for chest pain For chest pain place 1 tablet under the tongue every 5 minutes for 3 doses. If symptoms persist 5 minutes after 1st dose call 911.   polyethylene glycol (MIRALAX/GLYCOLAX) packet prn Nursing Home Yes Yes   Sig: Take 1 packet by mouth daily as needed for constipation   senna-docusate (SENOKOT-S/PERICOLACE) 8.6-50 MG tablet prn Nursing Home Yes Yes   Sig: Take 1 tablet by mouth 2 times daily as needed for constipation   tamsulosin (FLOMAX) 0.4 MG capsule 7/30/2019 at Unknown time Nursing Home Yes Yes   Sig: Take 0.4 mg by mouth every morning   vitamin D3 (CHOLECALCIFEROL) 400 units capsule 7/30/2019 at Unknown time Nursing Home Yes Yes   Sig: Take 800 Units by mouth daily   warfarin (COUMADIN) 3 MG tablet 7/30/2019 Nursing Home Yes Yes   Sig: Take 3 mg by mouth Mon, Tues, Thurs, Fri, Sat, Sun   warfarin (COUMADIN)  4 MG tablet 7/24/2019 Nursing Home Yes Yes   Sig: Take 4 mg by mouth every 7 days Wednesday      Facility-Administered Medications: None     Allergies   Allergies   Allergen Reactions     Penicillins        Social History   I have reviewed this patient's social history and updated it with pertinent information if needed. Placido Hinton  reports that he has been smoking.  He does not have any smokeless tobacco history on file. He reports that he drinks alcohol. He reports that he does not use drugs.    Family History   I have reviewed this patient's family history and updated it with pertinent information if needed.   History reviewed. No pertinent family history.    Review of Systems   The 10 point Review of Systems is negative other than noted in the HPI or here.     Physical Exam   Temp: 99.2  F (37.3  C) Temp src: Oral BP: (!) 161/104(not met for BP med) Pulse: 85 Heart Rate: 83 Resp: 16 SpO2: 97 % O2 Device: None (Room air)    Vital Signs with Ranges  Temp:  [98  F (36.7  C)-99.2  F (37.3  C)] 99.2  F (37.3  C)  Pulse:  [] 85  Heart Rate:  [76-83] 83  Resp:  [14-16] 16  BP: (113-177)/() 161/104  SpO2:  [97 %-99 %] 97 %  0 lbs 0 oz    Constitutional:  NAD  HEENT: normocephalic  CV: RRR  Pulm: CTAB, nonlabored respirations  GI: soft, NT/ND  MSK: warm, dry, pink  RLE: R AKA  LLE: multiple healed scars, biphasic DP/PT, healed great toe amp site, motor/sensory intact, slightly cooler to touch  Neuro: A&O, motor/sensory grossly intact  Psych: Appropriate    Data   Results for orders placed or performed during the hospital encounter of 07/31/19 (from the past 24 hour(s))   Alcohol breath test POCT   Result Value Ref Range    Alcohol Breath Test 0.158 (A) 0.00 - 0.01   CBC with platelets + differential   Result Value Ref Range    WBC 6.0 4.0 - 11.0 10e9/L    RBC Count 4.10 (L) 4.4 - 5.9 10e12/L    Hemoglobin 14.7 13.3 - 17.7 g/dL    Hematocrit 42.1 40.0 - 53.0 %     (H) 78 - 100 fl    MCH 35.9 (H) 26.5  - 33.0 pg    MCHC 34.9 31.5 - 36.5 g/dL    RDW 13.8 10.0 - 15.0 %    Platelet Count 259 150 - 450 10e9/L    Diff Method Automated Method     % Neutrophils 63.3 %    % Lymphocytes 31.4 %    % Monocytes 4.5 %    % Eosinophils 0.3 %    % Basophils 0.3 %    % Immature Granulocytes 0.2 %    Nucleated RBCs 0 0 /100    Absolute Neutrophil 3.8 1.6 - 8.3 10e9/L    Absolute Lymphocytes 1.9 0.8 - 5.3 10e9/L    Absolute Monocytes 0.3 0.0 - 1.3 10e9/L    Absolute Eosinophils 0.0 0.0 - 0.7 10e9/L    Absolute Basophils 0.0 0.0 - 0.2 10e9/L    Abs Immature Granulocytes 0.0 0 - 0.4 10e9/L    Absolute Nucleated RBC 0.0    Comprehensive metabolic panel   Result Value Ref Range    Sodium 141 133 - 144 mmol/L    Potassium 3.8 3.4 - 5.3 mmol/L    Chloride 107 94 - 109 mmol/L    Carbon Dioxide 26 20 - 32 mmol/L    Anion Gap 8 3 - 14 mmol/L    Glucose 127 (H) 70 - 99 mg/dL    Urea Nitrogen 14 7 - 30 mg/dL    Creatinine 0.68 0.66 - 1.25 mg/dL    GFR Estimate >90 >60 mL/min/[1.73_m2]    GFR Estimate If Black >90 >60 mL/min/[1.73_m2]    Calcium 8.1 (L) 8.5 - 10.1 mg/dL    Bilirubin Total 0.2 0.2 - 1.3 mg/dL    Albumin 3.2 (L) 3.4 - 5.0 g/dL    Protein Total 7.0 6.8 - 8.8 g/dL    Alkaline Phosphatase 122 40 - 150 U/L    ALT 52 0 - 70 U/L    AST 86 (H) 0 - 45 U/L   Glucose by meter   Result Value Ref Range    Glucose 67 (L) 70 - 99 mg/dL   INR   Result Value Ref Range    INR 2.20 (H) 0.86 - 1.14   Glucose by meter   Result Value Ref Range    Glucose 74 70 - 99 mg/dL   Glucose by meter   Result Value Ref Range    Glucose 105 (H) 70 - 99 mg/dL   Glucose by meter   Result Value Ref Range    Glucose 48 (LL) 70 - 99 mg/dL   Glucose by meter   Result Value Ref Range    Glucose 82 70 - 99 mg/dL   Glucose by meter   Result Value Ref Range    Glucose 124 (H) 70 - 99 mg/dL   INR   Result Value Ref Range    INR 2.18 (H) 0.86 - 1.14   Glucose   Result Value Ref Range    Glucose 69 (L) 70 - 99 mg/dL   Glucose by meter   Result Value Ref Range    Glucose 98  70 - 99 mg/dL   Glucose by meter   Result Value Ref Range    Glucose 135 (H) 70 - 99 mg/dL   Glucose by meter   Result Value Ref Range    Glucose 83 70 - 99 mg/dL   US CHRISTA Doppler No Exercise    Narrative    ULTRASOUND CHRISTA DOPPLER NO EXERCISE, 1-2 LEVELS LEFT August 1, 2019  12:38 PM     HISTORY: Left leg, cool foot, history of vascular disease, status post  great toe amputation.    COMPARISON: None.    FINDINGS:  Left CHRISTA: 0.91.    Waveforms: Triphasic.      Impression    IMPRESSION: Left CHRISTA shows mild arterial insufficiency.    CHRISTA CRITERIA:  >0.95 Normal  0.90 - 0.94 Mild  0.5 - 0.89 Moderate  0.2 - 0.49 Severe  <0.2 Critical   US Lower Extremity Arterial Duplex Left    Narrative    ULTRASOUND LOWER EXTREMITY ARTERIAL DUPLEX LEFT   8/1/2019 1:16 PM     HISTORY: Evaluate patency of left fem-pop graft.    COMPARISON: None.    FINDINGS: Color Doppler and spectral waveform analysis was performed  throughout the left femoral to popliteal artery bypass. The bypass is  patent. Focal area of soft plaque is noted within the mid graft, at  this level slightly elevated velocities with a peak systolic velocity  of 109 cm/s and a diameter of 3.3 mm. Diameters throughout the graft  range between 3.3 mm and 5.8 mm. Inflow and outflow arteries are  patent.    There is a hypoechoic collection around the graft measuring 7.4 x 1.7  x 1.5 cm, may represent a postoperative hematoma.      Impression    IMPRESSION:   1. Patent left femoral to popliteal artery bypass.  2. Area of soft plaque noted within the mid graft without focal  narrowing.  3. Hypoechoic collection around the graft measuring 7.4 x 1.7 x 1.5  cm, may represent a hematoma.

## 2019-08-02 LAB
ALBUMIN SERPL-MCNC: 3.2 G/DL (ref 3.4–5)
ALP SERPL-CCNC: 125 U/L (ref 40–150)
ALT SERPL W P-5'-P-CCNC: 40 U/L (ref 0–70)
ANION GAP SERPL CALCULATED.3IONS-SCNC: 7 MMOL/L (ref 3–14)
AST SERPL W P-5'-P-CCNC: 50 U/L (ref 0–45)
BILIRUB SERPL-MCNC: 1 MG/DL (ref 0.2–1.3)
BUN SERPL-MCNC: 17 MG/DL (ref 7–30)
CALCIUM SERPL-MCNC: 8.8 MG/DL (ref 8.5–10.1)
CHLORIDE SERPL-SCNC: 104 MMOL/L (ref 94–109)
CO2 SERPL-SCNC: 26 MMOL/L (ref 20–32)
CREAT SERPL-MCNC: 0.9 MG/DL (ref 0.66–1.25)
GFR SERPL CREATININE-BSD FRML MDRD: >90 ML/MIN/{1.73_M2}
GLUCOSE BLDC GLUCOMTR-MCNC: 116 MG/DL (ref 70–99)
GLUCOSE BLDC GLUCOMTR-MCNC: 152 MG/DL (ref 70–99)
GLUCOSE BLDC GLUCOMTR-MCNC: 178 MG/DL (ref 70–99)
GLUCOSE BLDC GLUCOMTR-MCNC: 343 MG/DL (ref 70–99)
GLUCOSE SERPL-MCNC: 203 MG/DL (ref 70–99)
INR PPP: 3.5 (ref 0.86–1.14)
POTASSIUM SERPL-SCNC: 4.1 MMOL/L (ref 3.4–5.3)
PROT SERPL-MCNC: 7 G/DL (ref 6.8–8.8)
SODIUM SERPL-SCNC: 137 MMOL/L (ref 133–144)

## 2019-08-02 PROCEDURE — 99225 ZZC SUBSEQUENT OBSERVATION CARE,LEVEL II: CPT | Performed by: HOSPITALIST

## 2019-08-02 PROCEDURE — 25000132 ZZH RX MED GY IP 250 OP 250 PS 637: Performed by: INTERNAL MEDICINE

## 2019-08-02 PROCEDURE — 96372 THER/PROPH/DIAG INJ SC/IM: CPT

## 2019-08-02 PROCEDURE — 25000128 H RX IP 250 OP 636: Performed by: INTERNAL MEDICINE

## 2019-08-02 PROCEDURE — 25000131 ZZH RX MED GY IP 250 OP 636 PS 637: Performed by: HOSPITALIST

## 2019-08-02 PROCEDURE — 36415 COLL VENOUS BLD VENIPUNCTURE: CPT | Performed by: INTERNAL MEDICINE

## 2019-08-02 PROCEDURE — 85610 PROTHROMBIN TIME: CPT | Performed by: INTERNAL MEDICINE

## 2019-08-02 PROCEDURE — 80053 COMPREHEN METABOLIC PANEL: CPT | Performed by: INTERNAL MEDICINE

## 2019-08-02 PROCEDURE — 00000146 ZZHCL STATISTIC GLUCOSE BY METER IP

## 2019-08-02 PROCEDURE — 96376 TX/PRO/DX INJ SAME DRUG ADON: CPT

## 2019-08-02 PROCEDURE — G0378 HOSPITAL OBSERVATION PER HR: HCPCS

## 2019-08-02 PROCEDURE — 25000132 ZZH RX MED GY IP 250 OP 250 PS 637: Performed by: HOSPITALIST

## 2019-08-02 RX ADMIN — ASPIRIN 81 MG: 81 TABLET, COATED ORAL at 09:11

## 2019-08-02 RX ADMIN — MAGNESIUM OXIDE TAB 400 MG (241.3 MG ELEMENTAL MG) 1200 MG: 400 (241.3 MG) TAB at 20:31

## 2019-08-02 RX ADMIN — INSULIN GLARGINE 8 UNITS: 100 INJECTION, SOLUTION SUBCUTANEOUS at 21:51

## 2019-08-02 RX ADMIN — HYDROMORPHONE HYDROCHLORIDE 1 MG: 2 TABLET ORAL at 01:35

## 2019-08-02 RX ADMIN — Medication 100 MG: at 09:12

## 2019-08-02 RX ADMIN — KETOROLAC TROMETHAMINE 30 MG: 30 INJECTION, SOLUTION INTRAMUSCULAR at 05:24

## 2019-08-02 RX ADMIN — GABAPENTIN 100 MG: 100 CAPSULE ORAL at 16:25

## 2019-08-02 RX ADMIN — METOPROLOL TARTRATE 25 MG: 25 TABLET, FILM COATED ORAL at 20:32

## 2019-08-02 RX ADMIN — HYDROMORPHONE HYDROCHLORIDE 1 MG: 2 TABLET ORAL at 05:30

## 2019-08-02 RX ADMIN — GABAPENTIN 100 MG: 100 CAPSULE ORAL at 09:12

## 2019-08-02 RX ADMIN — BUPROPION HYDROCHLORIDE 300 MG: 300 TABLET, FILM COATED, EXTENDED RELEASE ORAL at 09:11

## 2019-08-02 RX ADMIN — HYDROMORPHONE HYDROCHLORIDE 1 MG: 2 TABLET ORAL at 20:31

## 2019-08-02 RX ADMIN — INSULIN ASPART 18 UNITS: 100 INJECTION, SOLUTION INTRAVENOUS; SUBCUTANEOUS at 09:15

## 2019-08-02 RX ADMIN — MAGNESIUM OXIDE TAB 400 MG (241.3 MG ELEMENTAL MG) 1200 MG: 400 (241.3 MG) TAB at 09:12

## 2019-08-02 RX ADMIN — ACETAMINOPHEN 975 MG: 325 TABLET, FILM COATED ORAL at 16:25

## 2019-08-02 RX ADMIN — MULTIPLE VITAMINS W/ MINERALS TAB 1 TABLET: TAB at 09:11

## 2019-08-02 RX ADMIN — GABAPENTIN 100 MG: 100 CAPSULE ORAL at 21:50

## 2019-08-02 RX ADMIN — FOLIC ACID 1 MG: 1 TABLET ORAL at 09:11

## 2019-08-02 RX ADMIN — ATORVASTATIN CALCIUM 40 MG: 40 TABLET, FILM COATED ORAL at 21:50

## 2019-08-02 RX ADMIN — ACETAMINOPHEN 975 MG: 325 TABLET, FILM COATED ORAL at 09:11

## 2019-08-02 RX ADMIN — KETOROLAC TROMETHAMINE 30 MG: 30 INJECTION, SOLUTION INTRAMUSCULAR at 13:13

## 2019-08-02 RX ADMIN — ACETAMINOPHEN 975 MG: 325 TABLET, FILM COATED ORAL at 21:50

## 2019-08-02 RX ADMIN — HYDROMORPHONE HYDROCHLORIDE 1 MG: 2 TABLET ORAL at 13:14

## 2019-08-02 RX ADMIN — METOPROLOL TARTRATE 25 MG: 25 TABLET, FILM COATED ORAL at 09:11

## 2019-08-02 RX ADMIN — HYDROMORPHONE HYDROCHLORIDE 1 MG: 2 TABLET ORAL at 09:15

## 2019-08-02 RX ADMIN — TAMSULOSIN HYDROCHLORIDE 0.4 MG: 0.4 CAPSULE ORAL at 09:12

## 2019-08-02 RX ADMIN — DULOXETINE HYDROCHLORIDE 60 MG: 60 CAPSULE, DELAYED RELEASE ORAL at 09:11

## 2019-08-02 RX ADMIN — INSULIN ASPART 18 UNITS: 100 INJECTION, SOLUTION INTRAVENOUS; SUBCUTANEOUS at 13:14

## 2019-08-02 RX ADMIN — HYDROMORPHONE HYDROCHLORIDE 1 MG: 2 TABLET ORAL at 16:25

## 2019-08-02 NOTE — PLAN OF CARE
Alert and oriented x 4. VSS, on room air. PO dilaudid, tylenol and toradol for left foot pain. 5 units lantus given night time. Refused nicotine patch. CIWA scores 5,5 Patient pleasant and cooperative.

## 2019-08-02 NOTE — PLAN OF CARE
A&O x 4, VSS on RA, pain controlled with oral analgesics,CMS intact per baseline, repositioned ind in bed, voiding adequately in urinal, IV SL, cooperative with cares, continue to monitor.

## 2019-08-02 NOTE — PROGRESS NOTES
Call for Lantuss dose, has hypoglycemia last night, this evening has BS of 98 only, last night has couple of episodes of hypoglycemia. At this time, I will hold 25 units of Lantuss and will just give him 5 units. Adjust further depending on his BS in AM by the rounding physician.

## 2019-08-02 NOTE — PROGRESS NOTES
[facility]    Medicine Progress Note - Hospitalist Service       Date of Admission:  7/31/2019  Assessment & Plan      This is a 59-year-old gentleman presenting with aggressive behavior in the setting of inadequate pain control and increasing alcohol consumption.     Left foot pain  Peripheral vascular disease  Followed with Dr Harvey as recently as 5/20/19 - history of right AKA and L fempop --> gangrenous L great toe now amputated. Smoker and uncontrolled diabetic.  - now reporting rest pain and phantom pain  - CHRISTA ordered, Vascular surgery consulted asap given his cool foot and I am unsure of the chronicity  ----> CHRISTA of left leg show mild arterial insufficiency  ----> LLE Arterial duplex - IMPRESSION:   1. Patent left femoral to popliteal artery bypass.  2. Area of soft plaque noted within the mid graft without focal  narrowing.  3. Hypoechoic collection around the graft measuring 7.4 x 1.7 x 1.5  cm, may represent a hematoma.    - prn dilaudid available for now  - pta regimen continued with ASA, lipitor, cymbalta, and gabapentin.  - continue outpatient management of vascular disease    Aggressive behavior.    Suspect this is likely probably a personality trait exacerbated by recent alcohol use.  The patient acknowledged that he dumped his urine out on the floor because they did not empty it fast enough at his care facility.  He also voiced displeasure with them having discontinued the gabapentin and the only thing that he is on for pain is Tylenol.  He has been obtaining alcohol through delivery service.   - initially admitted under observation with primary concern of establishing a safe disposition  - remains calm and cooperative  - transaminases normalizing, abdomen benign    Alcohol use.  Former drinker who has recently increased his alcohol intake via online delivery service to the [facility] because of his left leg/foot pain.  Mild hepatitis noted on labs. No history of withdrawal reported  -  [facility] protocol  - decline chem dep or psychiatry involvement currently  - he is competent to make his own decisions.     Chronic pain.    The patient reported that Dilaudid was effective for him.  He is now only on Tylenol.  He has also previously been on gabapentin, which was tapered down for unclear reasons.  At this point, I will restart gabapentin and continue Tylenol.    - s/p toradol in ED  - prn dilaudid will be scaled back  - he has been tapering off gabapentin apparently, would recommend increasing unless there was contraindication?    History of DVT     The patient is typically maintained on warfarin.  INR 3.5 today. We will have pharmacy dose warfarin.     History of type 2 diabetes.  Last hemoglobin A1c in the 10-11 range.    - PTA regimen 52 lantus nightly, 18 aspart cml, and sliding scale  - he is not eating much, hypoglycemic last night, now ok after multiple juices  - will add holding parameters to CML aspart, and decrease lantus to 25, somewhat arbitrarily - he got 30 last night and AM sugars were on low side      Diet: Moderate Consistent CHO Diet    DVT Prophylaxis: Warfarin  Sequeira Catheter: not present  Code Status: Full Code      Disposition Plan   Expected discharge: Medically appropriate for discharge, awaiting placement - back to prior living facility possibly. Appreciate SW/CT assistance.  Entered: Kevin Rabago MD 08/02/2019, 1:26 PM       The patient's care was discussed with the Bedside Nurse, Care Coordinator/ and Patient.    Kevin Rabago MD  Hospitalist Service  [facility]    ______________________________________________________________________    Interval History   Seen and examined. Calm and pleasant. No new complaints. No fevers or chills. Left foot pain ongoing. Vascular surgery saw him yesterday, CHRISTA ok, no intervention/eval required.    Data reviewed today: I reviewed all medications, new labs and imaging results over the last 24 hours. I  personally reviewed no images or EKG's today.    Physical Exam   Vital Signs: Temp: 98.2  F (36.8  C) Temp src: Oral BP: 124/87 Pulse: 74 Heart Rate: 83 Resp: 16 SpO2: 96 % O2 Device: None (Room air)    Weight: 173 lbs 3.2 oz  Gen: NAD, pleasant  HEENT: Normocephalic, EOMI, MMM  Resp: no crackles,  no wheezes, no increased work of resp  CV: S1S2 heard, reg rhythm, reg rate  Abdo: soft, nontender, nondistended, bowel sounds present  Ext: calves nontender, R AKA, L great toe amp - site looks good.   Neuro: AAOx3, CN grossly intact, no facial asymmetry      Data   Recent Labs   Lab 08/02/19  0728 08/01/19  0643 07/31/19  2312 07/31/19  1850   WBC  --   --   --  6.0   HGB  --   --   --  14.7   MCV  --   --   --  103*   PLT  --   --   --  259   INR 3.50* 2.18* 2.20*  --      --   --  141   POTASSIUM 4.1  --   --  3.8   CHLORIDE 104  --   --  107   CO2 26  --   --  26   BUN 17  --   --  14   CR 0.90  --   --  0.68   ANIONGAP 7  --   --  8   CHRIS 8.8  --   --  8.1*   * 69*  --  127*   ALBUMIN 3.2*  --   --  3.2*   PROTTOTAL 7.0  --   --  7.0   BILITOTAL 1.0  --   --  0.2   ALKPHOS 125  --   --  122   ALT 40  --   --  52   AST 50*  --   --  86*     No results found for this or any previous visit (from the past 24 hour(s)).

## 2019-08-02 NOTE — PLAN OF CARE
Pt A/OX4. Pt pleasant and cooperative with cares. VSS on RA. CMS intact ex numbness in LLE. Pain in L foot managed with PRN PO dilaudid.CIWA scored 5.  BS at 0200 178. Voiding in urinal. Mod-carb diet. Nursing will continue to monitor.

## 2019-08-02 NOTE — PROVIDER NOTIFICATION
Paged hospitalist regarding scheduled 25 units of Lantus.  BG 98 at 2100  Patient's BG last night was 48 and 69    Dr Aquino called, orders received.

## 2019-08-02 NOTE — PROGRESS NOTES
DANY  I: DANY was updated that patient would be ready for discharge today. DANY spoke with admissions at [facility] who requested SW speak with DON. Admissions took DANY information and will have DON contact DANY regarding whether they can accept patient back. DANY awaits a c/b.     P: DANY will continue to follow and assist as needed.    Marlene Rhodes, FAWN   *[phone]

## 2019-08-03 LAB
GLUCOSE BLDC GLUCOMTR-MCNC: 182 MG/DL (ref 70–99)
GLUCOSE BLDC GLUCOMTR-MCNC: 262 MG/DL (ref 70–99)
GLUCOSE BLDC GLUCOMTR-MCNC: 303 MG/DL (ref 70–99)
GLUCOSE BLDC GLUCOMTR-MCNC: 328 MG/DL (ref 70–99)
GLUCOSE BLDC GLUCOMTR-MCNC: 336 MG/DL (ref 70–99)
INR PPP: 4.58 (ref 0.86–1.14)

## 2019-08-03 PROCEDURE — 36415 COLL VENOUS BLD VENIPUNCTURE: CPT | Performed by: INTERNAL MEDICINE

## 2019-08-03 PROCEDURE — 99225 ZZC SUBSEQUENT OBSERVATION CARE,LEVEL II: CPT | Performed by: INTERNAL MEDICINE

## 2019-08-03 PROCEDURE — 25000132 ZZH RX MED GY IP 250 OP 250 PS 637: Performed by: INTERNAL MEDICINE

## 2019-08-03 PROCEDURE — 96372 THER/PROPH/DIAG INJ SC/IM: CPT

## 2019-08-03 PROCEDURE — 85610 PROTHROMBIN TIME: CPT | Performed by: INTERNAL MEDICINE

## 2019-08-03 PROCEDURE — 25000131 ZZH RX MED GY IP 250 OP 636 PS 637: Performed by: INTERNAL MEDICINE

## 2019-08-03 PROCEDURE — G0378 HOSPITAL OBSERVATION PER HR: HCPCS

## 2019-08-03 PROCEDURE — 25000132 ZZH RX MED GY IP 250 OP 250 PS 637: Performed by: HOSPITALIST

## 2019-08-03 PROCEDURE — 00000146 ZZHCL STATISTIC GLUCOSE BY METER IP

## 2019-08-03 RX ORDER — GABAPENTIN 100 MG/1
200 CAPSULE ORAL 3 TIMES DAILY
Status: DISCONTINUED | OUTPATIENT
Start: 2019-08-03 | End: 2019-08-05

## 2019-08-03 RX ADMIN — METOPROLOL TARTRATE 25 MG: 25 TABLET, FILM COATED ORAL at 20:13

## 2019-08-03 RX ADMIN — HYDROMORPHONE HYDROCHLORIDE 1 MG: 2 TABLET ORAL at 15:11

## 2019-08-03 RX ADMIN — ACETAMINOPHEN 975 MG: 325 TABLET, FILM COATED ORAL at 21:49

## 2019-08-03 RX ADMIN — ASPIRIN 81 MG: 81 TABLET, COATED ORAL at 09:26

## 2019-08-03 RX ADMIN — HYDROMORPHONE HYDROCHLORIDE 1 MG: 2 TABLET ORAL at 04:40

## 2019-08-03 RX ADMIN — FOLIC ACID 1 MG: 1 TABLET ORAL at 15:12

## 2019-08-03 RX ADMIN — MAGNESIUM OXIDE TAB 400 MG (241.3 MG ELEMENTAL MG) 1200 MG: 400 (241.3 MG) TAB at 20:13

## 2019-08-03 RX ADMIN — HYDROMORPHONE HYDROCHLORIDE 1 MG: 2 TABLET ORAL at 00:38

## 2019-08-03 RX ADMIN — Medication 100 MG: at 09:26

## 2019-08-03 RX ADMIN — MAGNESIUM OXIDE TAB 400 MG (241.3 MG ELEMENTAL MG) 1200 MG: 400 (241.3 MG) TAB at 09:26

## 2019-08-03 RX ADMIN — GABAPENTIN 200 MG: 100 CAPSULE ORAL at 21:49

## 2019-08-03 RX ADMIN — GABAPENTIN 200 MG: 100 CAPSULE ORAL at 15:11

## 2019-08-03 RX ADMIN — INSULIN ASPART 18 UNITS: 100 INJECTION, SOLUTION INTRAVENOUS; SUBCUTANEOUS at 09:29

## 2019-08-03 RX ADMIN — DULOXETINE HYDROCHLORIDE 60 MG: 60 CAPSULE, DELAYED RELEASE ORAL at 15:12

## 2019-08-03 RX ADMIN — METOPROLOL TARTRATE 25 MG: 25 TABLET, FILM COATED ORAL at 09:26

## 2019-08-03 RX ADMIN — ACETAMINOPHEN 975 MG: 325 TABLET, FILM COATED ORAL at 09:25

## 2019-08-03 RX ADMIN — BUPROPION HYDROCHLORIDE 300 MG: 300 TABLET, FILM COATED, EXTENDED RELEASE ORAL at 09:26

## 2019-08-03 RX ADMIN — MULTIPLE VITAMINS W/ MINERALS TAB 1 TABLET: TAB at 09:25

## 2019-08-03 RX ADMIN — TAMSULOSIN HYDROCHLORIDE 0.4 MG: 0.4 CAPSULE ORAL at 09:26

## 2019-08-03 RX ADMIN — ATORVASTATIN CALCIUM 40 MG: 40 TABLET, FILM COATED ORAL at 21:49

## 2019-08-03 RX ADMIN — HYDROMORPHONE HYDROCHLORIDE 1 MG: 2 TABLET ORAL at 09:25

## 2019-08-03 RX ADMIN — INSULIN GLARGINE 25 UNITS: 100 INJECTION, SOLUTION SUBCUTANEOUS at 21:49

## 2019-08-03 RX ADMIN — GABAPENTIN 100 MG: 100 CAPSULE ORAL at 09:28

## 2019-08-03 RX ADMIN — HYDROMORPHONE HYDROCHLORIDE 1 MG: 2 TABLET ORAL at 20:13

## 2019-08-03 RX ADMIN — ACETAMINOPHEN 975 MG: 325 TABLET, FILM COATED ORAL at 15:11

## 2019-08-03 NOTE — PLAN OF CARE
A&O.  VSS, RA.  CIWA score 2 and 3.  CMS intact.  Pain managed with po dilaudid.  Up with 1, pivots to chair.  Voiding adequate amount in urinal.  BG check, insulin per order.  Waiting placement.  Continue to monitor.

## 2019-08-03 NOTE — PROGRESS NOTES
DANY:   Discharge Planner   Discharge Plans in progress: DANY received VM from Alissa at [facility] stating that facility requires a face-to face assmt and care plan arranged prior to accepting patient back. Alissa also inquiring about patient signature on Bed Hold form. DANY spoke to charged nurse who spoke to DOREEN, took this writer's number and informed that DOREEN would call this writer back.  Barriers to discharge plan: LTC facility may be unwilling to readmit over weekend and before they come for in person assmt of pt as well as a patient care plan due to previous behaviors while residing at facility  Follow up plan: DANY awaiting return call from DOREEN.       Entered by: Darlyn Mena 08/03/2019 11:08 AM

## 2019-08-03 NOTE — PROGRESS NOTES
[facility]    Medicine Progress Note - Hospitalist Service       Date of Admission:  7/31/2019  Assessment & Plan   Placido Hinton is a 59-year-old M admitted 7/31/2019 with aggressive behavior in the setting of inadequate pain control and increasing alcohol consumption.      Left foot pain  Peripheral vascular disease s/p right AKA and L fem-pop bypass, gangrenous L great toe now amputated.  Followed with Dr Harvey as recently as 5/20/19. Vascular surgery consulted this admission due to complaint of rest pain.  CHRISTA of left leg showed mild arterial insufficiency, left lower extremity arterial duplex demonstrated patent left femoral to popliteal artery bypass, hypoechoic collection around the graft possibly representing a hematoma.  No further intervention recommended per vascular surgery.  - Continue hydromorphone PRN  - Increase gabapentin as below  - Continue outpatient follow-up with vascular surgery   - Continue aspirin, atorvastatin     Aggressive behavior  Suspect this is likely probably a personality trait exacerbated by recent alcohol use.  The patient acknowledged that he dumped his urine out on the floor because they did not empty it fast enough at his care facility.  He also voiced displeasure with them having discontinued the gabapentin and the only thing that he is on for pain is acetaminophen.  He has been obtaining alcohol through delivery service.  - Remains calm and cooperative  - SW involved for disposition planning, facility apparently needs to reassess prior to taking him back     Alcohol overuse  Former drinker, recently increased his alcohol intake via online delivery service to the [facility] because of his left leg/foot pain.  Mild hepatitis noted on labs. No history of withdrawal reported.  - CIWA protocol, scores have been stable and has not required treatment.  - Discussed need for complete cessation of alcohol if narcotics are to be prescribed on discharge     Chronic pain  His  gabapentin has been tapered down as an outpatient, though for unclear reasons. Since gabapentin has been tapering down he reports his pain is worse.  He denies any history of side effects with gabapentin.  - He reports he has an appointment for what sounds to be a spinal steroid injection 8/9  - Increase gabapentin back to 200 mg TID for now, follow-up closely with his outpatient providers to re-visit plan for taper   - Continue hydromorphone PRN      History of DVT     - INR supra-therapeutic  - He reports he has been instructed to hold warfarin for 5 days prior to his procedure on 8/9/19. Given current supra-therapeutic level, will hold any further warfarin to allow more time to drift to goal levels prior to procedure      Diabetes mellitus, type 2 with diabetic neuropathy  HgbA1c most recently in 10-11 range. Prior to admission on glargine 52 units at bedtime, prandial aspart 18 units. Reports occasional lows in the 50s-60s at nursing home on this regimen.   - Blood sugars trending up, increase glargine to 25 units at bedtime, change fixed mealtime dosing to 1 unit per 5 grams of carbohydrates given somewhat variable intake    Diet: Moderate Consistent CHO Diet    DVT Prophylaxis: Warfarin  Sequeira Catheter: not present  Code Status: Full Code      Disposition Plan   Expected discharge: Medically stable for discharge, awaiting facility reassessment to determine if he is appropriate to return to his previous location.  Entered: Mark Townsend MD 08/03/2019, 1:39 PM       The patient's care was discussed with the Patient and SW    Mark Townsend MD  Hospitalist Service  [facility]    ______________________________________________________________________    Interval History   No acute events overnight. Continues to report sharp, shooting pain in variable locations of his left foot though all below the ankle.  Denies any shooting pains down the back of his leg.  Eating and drinking okay.  Denies  any chest pain or shortness of breath.    Data reviewed today: I reviewed all medications, new labs and imaging results over the last 24 hours. I personally reviewed no images or EKG's today.    Physical Exam   Vital Signs: Temp: 98  F (36.7  C) Temp src: Oral BP: 107/66 Pulse: 69 Heart Rate: 71 Resp: 16 SpO2: 96 % O2 Device: None (Room air)    Weight: 173 lbs 3.2 oz    Constitutional: Well-appearing, NAD  Respiratory: Clear to auscultation bilaterally, good air movement bilaterally  Cardiovascular: RRR, no m/r/g.   GI: Soft, non-tender, non-distended.   Skin/Integumen: Warm, dry  Neuro/Psych: Alert.  Responding to questions appropriately.  Calm and cooperative.    Data   Recent Labs   Lab 08/03/19  0651 08/02/19  0728 08/01/19  0643  07/31/19  1850   WBC  --   --   --   --  6.0   HGB  --   --   --   --  14.7   MCV  --   --   --   --  103*   PLT  --   --   --   --  259   INR 4.58* 3.50* 2.18*   < >  --    NA  --  137  --   --  141   POTASSIUM  --  4.1  --   --  3.8   CHLORIDE  --  104  --   --  107   CO2  --  26  --   --  26   BUN  --  17  --   --  14   CR  --  0.90  --   --  0.68   ANIONGAP  --  7  --   --  8   CHRIS  --  8.8  --   --  8.1*   GLC  --  203* 69*  --  127*   ALBUMIN  --  3.2*  --   --  3.2*   PROTTOTAL  --  7.0  --   --  7.0   BILITOTAL  --  1.0  --   --  0.2   ALKPHOS  --  125  --   --  122   ALT  --  40  --   --  52   AST  --  50*  --   --  86*    < > = values in this interval not displayed.       No results found for this or any previous visit (from the past 24 hour(s)).  Medications     Warfarin Therapy Reminder         acetaminophen  975 mg Oral TID     aspirin  81 mg Oral Daily     atorvastatin  40 mg Oral At Bedtime     buPROPion  300 mg Oral QAM     DULoxetine  60 mg Oral Daily     folic acid  1 mg Oral Daily     gabapentin  200 mg Oral TID     insulin aspart   Subcutaneous TID AC     insulin aspart  1-7 Units Subcutaneous TID AC     insulin aspart  1-5 Units Subcutaneous At Bedtime     insulin  glargine  25 Units Subcutaneous At Bedtime     magnesium oxide  1,200 mg Oral BID     metoprolol tartrate  25 mg Oral BID     multivitamin w/minerals  1 tablet Oral Daily     nicotine  1 patch Transdermal Daily     nicotine   Transdermal Q8H     nicotine   Transdermal Daily     tamsulosin  0.4 mg Oral QAM     vitamin B1  100 mg Oral Daily     warfarin-No DOSE today  1 each Does not apply no dose today (warfarin)

## 2019-08-03 NOTE — PLAN OF CARE
A&O x 4, VSS on RA, pain controlled with oral analgesics, CMS intact, up with assist of 1, voiding adequately in the urinal, IV SL, continue to monitor.

## 2019-08-03 NOTE — PROGRESS NOTES
PRIMARY DIAGNOSIS: ACUTE PAIN  OUTPATIENT/OBSERVATION GOALS TO BE MET BEFORE DISCHARGE:  1. Pain Status: Improved-controlled with oral pain medications.    2. Return to near baseline physical activity: Yes    3. Cleared for discharge by consultants (if involved): No    Discharge Planner Nurse   Safe discharge environment identified: No  Barriers to discharge: Yes       Entered by: Bhaskar Jeronimo 08/03/2019 12:45 AM     Please review provider order for any additional goals.   Nurse to notify provider when observation goals have been met and patient is ready for discharge.

## 2019-08-03 NOTE — PLAN OF CARE
A&Ox4. VSS on RA. CIWA scores 3 and 4 for tremors and sweat.  Left foot pain managed w/ PO Dilaudid q4hrs. Baseline numbness to LLE, left pedal pulse present w/ doppler. Right AKA. On Mod CHO diet. . +BS. Voiding adequately per urinal. Up Ax1 to BSC. Continue to monitor.

## 2019-08-04 LAB
GLUCOSE BLDC GLUCOMTR-MCNC: 286 MG/DL (ref 70–99)
GLUCOSE BLDC GLUCOMTR-MCNC: 297 MG/DL (ref 70–99)
GLUCOSE BLDC GLUCOMTR-MCNC: 306 MG/DL (ref 70–99)
GLUCOSE BLDC GLUCOMTR-MCNC: 312 MG/DL (ref 70–99)
INR PPP: 2.43 (ref 0.86–1.14)

## 2019-08-04 PROCEDURE — 25000132 ZZH RX MED GY IP 250 OP 250 PS 637: Performed by: HOSPITALIST

## 2019-08-04 PROCEDURE — 96372 THER/PROPH/DIAG INJ SC/IM: CPT

## 2019-08-04 PROCEDURE — 25000132 ZZH RX MED GY IP 250 OP 250 PS 637: Performed by: INTERNAL MEDICINE

## 2019-08-04 PROCEDURE — 25000131 ZZH RX MED GY IP 250 OP 636 PS 637: Performed by: INTERNAL MEDICINE

## 2019-08-04 PROCEDURE — 85610 PROTHROMBIN TIME: CPT | Performed by: INTERNAL MEDICINE

## 2019-08-04 PROCEDURE — 99225 ZZC SUBSEQUENT OBSERVATION CARE,LEVEL II: CPT | Performed by: INTERNAL MEDICINE

## 2019-08-04 PROCEDURE — 00000146 ZZHCL STATISTIC GLUCOSE BY METER IP

## 2019-08-04 PROCEDURE — G0378 HOSPITAL OBSERVATION PER HR: HCPCS

## 2019-08-04 PROCEDURE — 99207 ZZC CDG-CODE CATEGORY CHANGED: CPT | Performed by: INTERNAL MEDICINE

## 2019-08-04 PROCEDURE — 36415 COLL VENOUS BLD VENIPUNCTURE: CPT | Performed by: INTERNAL MEDICINE

## 2019-08-04 RX ORDER — SENNOSIDES 8.6 MG
1 TABLET ORAL 2 TIMES DAILY
Status: DISCONTINUED | OUTPATIENT
Start: 2019-08-04 | End: 2019-08-07 | Stop reason: HOSPADM

## 2019-08-04 RX ADMIN — ACETAMINOPHEN 975 MG: 325 TABLET, FILM COATED ORAL at 16:38

## 2019-08-04 RX ADMIN — GABAPENTIN 200 MG: 100 CAPSULE ORAL at 21:36

## 2019-08-04 RX ADMIN — ACETAMINOPHEN 975 MG: 325 TABLET, FILM COATED ORAL at 10:34

## 2019-08-04 RX ADMIN — HYDROMORPHONE HYDROCHLORIDE 1 MG: 2 TABLET ORAL at 00:36

## 2019-08-04 RX ADMIN — MULTIPLE VITAMINS W/ MINERALS TAB 1 TABLET: TAB at 10:35

## 2019-08-04 RX ADMIN — BUPROPION HYDROCHLORIDE 300 MG: 300 TABLET, FILM COATED, EXTENDED RELEASE ORAL at 10:36

## 2019-08-04 RX ADMIN — ATORVASTATIN CALCIUM 40 MG: 40 TABLET, FILM COATED ORAL at 21:36

## 2019-08-04 RX ADMIN — Medication 100 MG: at 10:35

## 2019-08-04 RX ADMIN — HYDROMORPHONE HYDROCHLORIDE 1 MG: 2 TABLET ORAL at 04:26

## 2019-08-04 RX ADMIN — DULOXETINE HYDROCHLORIDE 60 MG: 60 CAPSULE, DELAYED RELEASE ORAL at 10:36

## 2019-08-04 RX ADMIN — ASPIRIN 81 MG: 81 TABLET, COATED ORAL at 10:35

## 2019-08-04 RX ADMIN — FOLIC ACID 1 MG: 1 TABLET ORAL at 10:36

## 2019-08-04 RX ADMIN — METOPROLOL TARTRATE 25 MG: 25 TABLET, FILM COATED ORAL at 21:36

## 2019-08-04 RX ADMIN — HYDROMORPHONE HYDROCHLORIDE 1 MG: 2 TABLET ORAL at 10:36

## 2019-08-04 RX ADMIN — ACETAMINOPHEN 975 MG: 325 TABLET, FILM COATED ORAL at 21:34

## 2019-08-04 RX ADMIN — MAGNESIUM OXIDE TAB 400 MG (241.3 MG ELEMENTAL MG) 1200 MG: 400 (241.3 MG) TAB at 21:35

## 2019-08-04 RX ADMIN — GABAPENTIN 200 MG: 100 CAPSULE ORAL at 10:36

## 2019-08-04 RX ADMIN — TAMSULOSIN HYDROCHLORIDE 0.4 MG: 0.4 CAPSULE ORAL at 10:36

## 2019-08-04 RX ADMIN — INSULIN GLARGINE 30 UNITS: 100 INJECTION, SOLUTION SUBCUTANEOUS at 21:40

## 2019-08-04 RX ADMIN — METOPROLOL TARTRATE 25 MG: 25 TABLET, FILM COATED ORAL at 10:34

## 2019-08-04 RX ADMIN — MAGNESIUM OXIDE TAB 400 MG (241.3 MG ELEMENTAL MG) 1200 MG: 400 (241.3 MG) TAB at 10:35

## 2019-08-04 RX ADMIN — GABAPENTIN 200 MG: 100 CAPSULE ORAL at 16:38

## 2019-08-04 NOTE — PROGRESS NOTES
[facility]    Medicine Progress Note - Hospitalist Service       Date of Admission:  7/31/2019  Assessment & Plan   Placido Hinton is a 59-year-old M admitted 7/31/2019 with aggressive behavior in the setting of inadequate pain control and increasing alcohol consumption.      Left foot pain  Peripheral vascular disease s/p right AKA and L fem-pop bypass, gangrenous L great toe now amputated  Followed with Dr Harvey as recently as 5/20/19. Vascular surgery consulted this admission due to complaint of rest pain.  CHRISTA of left leg showed mild arterial insufficiency, left lower extremity arterial duplex demonstrated patent left femoral to popliteal artery bypass, hypoechoic collection around the graft possibly representing a hematoma.  No further intervention recommended per vascular surgery.  - Continue hydromorphone PRN  - Increase gabapentin as below  - Continue outpatient follow-up with vascular surgery   - Continue aspirin, atorvastatin     Aggressive behavior  Suspect this is likely probably a personality trait exacerbated by recent alcohol use.  The patient acknowledged that he dumped his urine out on the floor because they did not empty it fast enough at his care facility.  He also voiced displeasure with them having discontinued the gabapentin and the only thing that he is on for pain is acetaminophen.  He has been obtaining alcohol through delivery service.  - Remains calm and cooperative  - SW involved for disposition planning, facility apparently needs to reassess prior to taking him back     Alcohol overuse  Former drinker, recently increased his alcohol intake via online delivery service to the [facility] because of his left leg/foot pain.  Mild hepatitis noted on labs. No history of withdrawal reported.  - CIWA scores have been stable and has not required treatment, discontinue CIWA protocol  - Have discussed need for complete cessation of alcohol if narcotics are to be prescribed on  discharge     Chronic pain  His gabapentin has been tapered down as an outpatient, though for unclear reasons. Since gabapentin has been tapering down he reports his pain is worse.  He denies any history of side effects with gabapentin.  - He reports he has an appointment for what sounds to be a spinal steroid injection 8/9  - Continue gabapentin 200 mg TID (increased this admission), follow-up closely with his outpatient providers to re-visit previous plan for taper   - Continue hydromorphone PRN      History of DVT     - He reports he has been instructed to hold warfarin for 5 days prior to his procedure on 8/9/19. Holding any further warfarin to allow more time to drift to goal levels prior to procedure      Diabetes mellitus, type 2 with diabetic neuropathy  HgbA1c most recently in 10-11 range. Prior to admission on glargine 52 units at bedtime, prandial aspart 18 units. Reports occasional lows in the 50s-60s at nursing home on this regimen.   - Blood sugars remain elevated, increase glargine to 30 units at bedtime, continue prandial 1 unit per 5 grams of carbohydrates, increase sliding scale insulin to high protocol     Diet: Moderate Consistent CHO Diet    DVT Prophylaxis: Warfarin  Sequeira Catheter: not present  Code Status: Full Code      Disposition Plan   Expected discharge: Medically stable for discharge, awaiting facility reassessment to determine if he is appropriate to return to his previous location.  Entered: Mark Townsend MD 08/04/2019, 2:57 PM       The patient's care was discussed with the Patient and SW    Mark Townsend MD  Hospitalist Service  [facility]    ______________________________________________________________________    Interval History   No acute events overnight. Continues to have episodes of sharp pain in left foot.  Overall slight improvement from yesterday.  Initially constipated this morning, had a bowel movement and feels improved.  Eating and drinking  well.    Data reviewed today: I reviewed all medications, new labs and imaging results over the last 24 hours. I personally reviewed no images or EKG's today.    Physical Exam   Vital Signs: Temp: 98.4  F (36.9  C) Temp src: Oral BP: 139/74   Heart Rate: 68 Resp: 16 SpO2: 97 % O2 Device: None (Room air)    Weight: 173 lbs 3.2 oz    Constitutional: Well-appearing, NAD  Respiratory: Clear to auscultation bilaterally, good air movement bilaterally  Cardiovascular: RRR, no m/r/g.   GI: Soft, non-tender, non-distended.   Skin/Integumen: Warm, dry  Neuro/Psych: Alert.  Responding to questions appropriately.  Calm and cooperative.    Data   Recent Labs   Lab 08/04/19  0633 08/03/19  0651 08/02/19  0728 08/01/19  0643  07/31/19  1850   WBC  --   --   --   --   --  6.0   HGB  --   --   --   --   --  14.7   MCV  --   --   --   --   --  103*   PLT  --   --   --   --   --  259   INR 2.43* 4.58* 3.50* 2.18*   < >  --    NA  --   --  137  --   --  141   POTASSIUM  --   --  4.1  --   --  3.8   CHLORIDE  --   --  104  --   --  107   CO2  --   --  26  --   --  26   BUN  --   --  17  --   --  14   CR  --   --  0.90  --   --  0.68   ANIONGAP  --   --  7  --   --  8   CHRIS  --   --  8.8  --   --  8.1*   GLC  --   --  203* 69*  --  127*   ALBUMIN  --   --  3.2*  --   --  3.2*   PROTTOTAL  --   --  7.0  --   --  7.0   BILITOTAL  --   --  1.0  --   --  0.2   ALKPHOS  --   --  125  --   --  122   ALT  --   --  40  --   --  52   AST  --   --  50*  --   --  86*    < > = values in this interval not displayed.       No results found for this or any previous visit (from the past 24 hour(s)).  Medications       acetaminophen  975 mg Oral TID     aspirin  81 mg Oral Daily     atorvastatin  40 mg Oral At Bedtime     buPROPion  300 mg Oral QAM     DULoxetine  60 mg Oral Daily     folic acid  1 mg Oral Daily     gabapentin  200 mg Oral TID     insulin aspart   Subcutaneous TID AC     insulin aspart  1-7 Units Subcutaneous TID AC     insulin aspart   1-5 Units Subcutaneous At Bedtime     insulin glargine  25 Units Subcutaneous At Bedtime     magnesium oxide  1,200 mg Oral BID     metoprolol tartrate  25 mg Oral BID     multivitamin w/minerals  1 tablet Oral Daily     nicotine  1 patch Transdermal Daily     nicotine   Transdermal Q8H     nicotine   Transdermal Daily     sennosides  1 tablet Oral BID     tamsulosin  0.4 mg Oral QAM     vitamin B1  100 mg Oral Daily

## 2019-08-04 NOTE — PLAN OF CARE
A&O x 4, VSS on RA, pain controlled with oral analgesics, drsg CDI, CMS intact per baseline, up with assist of 1 W/C, voiding adequately in urinal, IV SL, Sleeping most of the day, continue to monitor.}

## 2019-08-04 NOTE — PLAN OF CARE
A&Ox4. VSS on RA. Up A1-2 pivot to BSC. Used urinal overnight with adequate UOP. CIWA 5,3,3. L foot Pain minimally managed with PO dilaudid Q4 overnight. R AKA. LLE numb at baseline with +1 pulses, lauro and scabs on shin. HS lantus and sliding scale given for . Refused nicotine patch. Continue to monitor and address pain plan.

## 2019-08-05 LAB
GLUCOSE BLDC GLUCOMTR-MCNC: 164 MG/DL (ref 70–99)
GLUCOSE BLDC GLUCOMTR-MCNC: 174 MG/DL (ref 70–99)
GLUCOSE BLDC GLUCOMTR-MCNC: 207 MG/DL (ref 70–99)
GLUCOSE BLDC GLUCOMTR-MCNC: 316 MG/DL (ref 70–99)
GLUCOSE BLDC GLUCOMTR-MCNC: 332 MG/DL (ref 70–99)
INR PPP: 1.47 (ref 0.86–1.14)

## 2019-08-05 PROCEDURE — 85610 PROTHROMBIN TIME: CPT | Performed by: INTERNAL MEDICINE

## 2019-08-05 PROCEDURE — 25000131 ZZH RX MED GY IP 250 OP 636 PS 637: Performed by: INTERNAL MEDICINE

## 2019-08-05 PROCEDURE — 96372 THER/PROPH/DIAG INJ SC/IM: CPT

## 2019-08-05 PROCEDURE — 36415 COLL VENOUS BLD VENIPUNCTURE: CPT | Performed by: INTERNAL MEDICINE

## 2019-08-05 PROCEDURE — 25000132 ZZH RX MED GY IP 250 OP 250 PS 637: Performed by: INTERNAL MEDICINE

## 2019-08-05 PROCEDURE — 00000146 ZZHCL STATISTIC GLUCOSE BY METER IP

## 2019-08-05 PROCEDURE — 99207 ZZC CDG-CODE CATEGORY CHANGED: CPT | Performed by: INTERNAL MEDICINE

## 2019-08-05 PROCEDURE — 99225 ZZC SUBSEQUENT OBSERVATION CARE,LEVEL II: CPT | Performed by: INTERNAL MEDICINE

## 2019-08-05 PROCEDURE — 25000132 ZZH RX MED GY IP 250 OP 250 PS 637: Performed by: HOSPITALIST

## 2019-08-05 PROCEDURE — G0378 HOSPITAL OBSERVATION PER HR: HCPCS

## 2019-08-05 RX ORDER — GABAPENTIN 300 MG/1
300 CAPSULE ORAL 3 TIMES DAILY
Status: DISCONTINUED | OUTPATIENT
Start: 2019-08-05 | End: 2019-08-07 | Stop reason: HOSPADM

## 2019-08-05 RX ADMIN — FOLIC ACID 1 MG: 1 TABLET ORAL at 09:11

## 2019-08-05 RX ADMIN — MULTIPLE VITAMINS W/ MINERALS TAB 1 TABLET: TAB at 09:11

## 2019-08-05 RX ADMIN — HYDROMORPHONE HYDROCHLORIDE 1 MG: 2 TABLET ORAL at 01:32

## 2019-08-05 RX ADMIN — GABAPENTIN 300 MG: 300 CAPSULE ORAL at 16:47

## 2019-08-05 RX ADMIN — METOPROLOL TARTRATE 25 MG: 25 TABLET, FILM COATED ORAL at 20:36

## 2019-08-05 RX ADMIN — ACETAMINOPHEN 975 MG: 325 TABLET, FILM COATED ORAL at 09:11

## 2019-08-05 RX ADMIN — DULOXETINE HYDROCHLORIDE 60 MG: 60 CAPSULE, DELAYED RELEASE ORAL at 09:11

## 2019-08-05 RX ADMIN — HYDROMORPHONE HYDROCHLORIDE 1 MG: 2 TABLET ORAL at 16:52

## 2019-08-05 RX ADMIN — SENNOSIDES 1 TABLET: 8.6 TABLET, FILM COATED ORAL at 09:11

## 2019-08-05 RX ADMIN — GABAPENTIN 200 MG: 100 CAPSULE ORAL at 09:10

## 2019-08-05 RX ADMIN — MAGNESIUM OXIDE TAB 400 MG (241.3 MG ELEMENTAL MG) 1200 MG: 400 (241.3 MG) TAB at 20:37

## 2019-08-05 RX ADMIN — ATORVASTATIN CALCIUM 40 MG: 40 TABLET, FILM COATED ORAL at 21:30

## 2019-08-05 RX ADMIN — HYDROMORPHONE HYDROCHLORIDE 1 MG: 2 TABLET ORAL at 21:30

## 2019-08-05 RX ADMIN — ASPIRIN 81 MG: 81 TABLET, COATED ORAL at 09:11

## 2019-08-05 RX ADMIN — BUPROPION HYDROCHLORIDE 300 MG: 300 TABLET, FILM COATED, EXTENDED RELEASE ORAL at 09:10

## 2019-08-05 RX ADMIN — HYDROMORPHONE HYDROCHLORIDE 1 MG: 2 TABLET ORAL at 11:40

## 2019-08-05 RX ADMIN — ACETAMINOPHEN 975 MG: 325 TABLET, FILM COATED ORAL at 16:47

## 2019-08-05 RX ADMIN — TAMSULOSIN HYDROCHLORIDE 0.4 MG: 0.4 CAPSULE ORAL at 09:11

## 2019-08-05 RX ADMIN — ACETAMINOPHEN 975 MG: 325 TABLET, FILM COATED ORAL at 21:30

## 2019-08-05 RX ADMIN — GABAPENTIN 300 MG: 300 CAPSULE ORAL at 21:30

## 2019-08-05 RX ADMIN — Medication 100 MG: at 09:10

## 2019-08-05 RX ADMIN — MAGNESIUM OXIDE TAB 400 MG (241.3 MG ELEMENTAL MG) 1200 MG: 400 (241.3 MG) TAB at 09:10

## 2019-08-05 RX ADMIN — INSULIN GLARGINE 40 UNITS: 100 INJECTION, SOLUTION SUBCUTANEOUS at 21:38

## 2019-08-05 RX ADMIN — METOPROLOL TARTRATE 25 MG: 25 TABLET, FILM COATED ORAL at 09:11

## 2019-08-05 RX ADMIN — NICOTINE 1 PATCH: 7 PATCH, EXTENDED RELEASE TRANSDERMAL at 22:53

## 2019-08-05 NOTE — PROGRESS NOTES
DANY  D: Called and left  for Alissa,  at [facility] to check on when pt can return.   P: Will continue to follow for discharge planning    Kathie PUENTES, [facility]    ADDENDUM @1500: Met with Elzbieta,  at [facility]. Lengthy discussion regarding pt's behaviors at the TCU. They wanted to know if psych has seen him yet. DANY explained psych has not been consulted but can ask the MD if they will consider placing a psych consult. DANY paged MD with psych request. Per Elzbieta pt cannot return until he signs new behavioral contract. DANY consulted with other SW who also agreed [facility] have to take pt back as no 30 day notice has been given. DANY emailed information to Elzbieta and explained pt will be returning to [facility] tomorrow. DANY updated Argelia Keith, manager of care coordination on above information.     ADDENDUM @3129: Per return email [facility] is refusing to take pt back and has issued an immediate discharge notice. Contacted the [facility] regarding assistance in helping pt return to [facility].

## 2019-08-05 NOTE — PLAN OF CARE
Pt A&Ox4, VSS on RA, c/o 8/10 foot pain managed with sched tyl and PRN dilaudid 1 mg x1. LLE scabbed, lauro, and dusky with numbness and tingling at baseline. L AKA, CMS intact. CIWAS discont but sweating noted.  had courtesy meal around 2200 last night. SW following for facility placement, discharge pending.

## 2019-08-05 NOTE — PLAN OF CARE
Pt a&o , VSS on RA , controlling pain with 1 mg po dilaudid, up with 1, mod carb diet,  cms intact, scabbing on R shin. Continue to monitor.

## 2019-08-05 NOTE — PLAN OF CARE
Date/Time 08/4/19 [id number]     Trauma/Ortho/Medical (Choose one) Medical     Diagnosis:Agressive behavior  POD#:NA  Mental Status:A/Ox4  Activity/dangle: Up Ax1-2 to pivot to BS,   Diet: Mod CHO   Pain:PO dilaudid q4hrs, none this shift.  Sequeira/Voiding: urinal  Tele/Restraints/Iso:NA  02/LDA: RA  D/C Date: TBD  Other Info: R AKA, L pedal pulses +1, doppler for better assessment. LLE lauro, scabs. Pain only minimally managed with PRN PO dilaudid Q4.      Up A1, pivot to BSC. A/ox4. VSS RA. CIWA 2. LLE pain managed w/scheduled tyl. LLE skin scabbed, lauro, numbness/tingling, pulses +1. , covered w/novolog scale. Diet: MCHO, pt asked for courtesy meal. Will continue to monitor.

## 2019-08-05 NOTE — PROGRESS NOTES
[facility]    Medicine Progress Note - Hospitalist Service       Date of Admission:  7/31/2019  Assessment & Plan   Placido Hinton is a 59-year-old M admitted 7/31/2019 with aggressive behavior in the setting of inadequate pain control and increasing alcohol consumption.    Acute on chronic left foot pain   Peripheral vascular disease s/p right AKA and L fem-pop bypass, gangrenous L great toe now amputated  Followed with Dr. Harvey as recently as 5/20/19. Vascular surgery consulted this admission due to complaint of rest pain.  CHRISTA of left leg showed mild arterial insufficiency, left lower extremity arterial duplex demonstrated patent left femoral to popliteal artery bypass, hypoechoic collection around the graft possibly representing a hematoma.  No further intervention recommended per vascular surgery.  - Increase gabapentin to 300 mg TID. His facility reports that the reason for gabapentin taper was because of his constant alcohol use.  - Continue hydromorphone PRN, wean as tolerated.  Has been requiring less frequent doses since gabapentin has been increased.  - Continue outpatient follow-up with vascular surgery   - Continue aspirin, atorvastatin  - He reports he has an appointment for what sounds to be a spinal steroid injection 8/9    Aggressive behavior  Multiple behavioral disturbances reported by facility including dumping urine on the floor, unwanted sexual advances against another resident, running into others at his facility with his wheelchair. 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 The patient acknowledged that he dumped his urine out on the floor because they did not empty it fast enough at his care facility. He also voiced displeasure about the management of his pain medications.  - He has been calm and cooperative here, though facility remains concerned about his escalating behavior and requesting psychiatry consult.  - Will consult psychiatry for any  additional recommendations, as well as for his alcohol dependence      Alcohol dependence  Former heavier drinker, per nursing home reports he has been accelerating his alcohol intake via online delivery service to the [facility], he reports due to his left foot pain.  Mild hepatitis noted on labs. No history of withdrawal reported.  - CIWA scores have been stable and has not required treatment, discontinued CIWA protocol  - Have discussed need for complete cessation of alcohol if narcotics are to be prescribed on discharge. Also now noted that the reason for his gabapentin taper is ongoing alcohol use.  Despite this, he remains unsure if he is willing to remain abstinent.  Clearly demonstrating at this point that alcohol is a barrier to his medical treatments.  Will consult chemical dependency for assessment if able prior to discharge.     History of DVT     - Holding warfarin in anticipation of spinal injection 8/9/19     Diabetes mellitus, type 2 with diabetic neuropathy  HgbA1c most recently in 10-11 range. Prior to admission on glargine 52 units at bedtime, prandial aspart 18 units. Reports occasional lows in the 50s-60s at nursing home on this regimen.   - Blood sugars remain elevated, increase glargine to 40 units at bedtime, continue prandial 1 unit per 5 grams of carbohydrates, continue sliding scale insulin to high protocol     Diet: Moderate Consistent CHO Diet    DVT Prophylaxis: Warfarin  Sequeira Catheter: not present  Code Status: Full Code      Disposition Plan   Expected discharge: Pending coordination with facility   Entered: Mark Townsend MD 08/05/2019, 2:03 PM       The patient's care was discussed with the Patient and SW    Mark Townsend MD  Hospitalist Service  [facility]    ______________________________________________________________________    Interval History   No acute events overnight. Continues to have episodes of sharp pain in left foot. Had bowel movement. Denies any  chest pain or shortness of breath. Workers from his facility have assessed him and are reportedly requiring behavioral contract to be signed before taking him back.     Data reviewed today: I reviewed all medications, new labs and imaging results over the last 24 hours. I personally reviewed no images or EKG's today.    Physical Exam   Vital Signs: Temp: 98  F (36.7  C) Temp src: Oral BP: 130/60   Heart Rate: 62 Resp: 16 SpO2: 97 % O2 Device: None (Room air)    Weight: 173 lbs 3.2 oz    Constitutional: Well-appearing, NAD  Respiratory: Clear to auscultation bilaterally, good air movement bilaterally  Cardiovascular: RRR, no m/r/g.   GI: Soft, non-tender, non-distended.   Skin/Integumen: Warm, dry  Neuro/Psych: Alert.  Responding to questions appropriately.  Calm and cooperative.    Data   Recent Labs   Lab 08/05/19  0700 08/04/19  0633 08/03/19  0651 08/02/19  0728 08/01/19  0643  07/31/19  1850   WBC  --   --   --   --   --   --  6.0   HGB  --   --   --   --   --   --  14.7   MCV  --   --   --   --   --   --  103*   PLT  --   --   --   --   --   --  259   INR 1.47* 2.43* 4.58* 3.50* 2.18*   < >  --    NA  --   --   --  137  --   --  141   POTASSIUM  --   --   --  4.1  --   --  3.8   CHLORIDE  --   --   --  104  --   --  107   CO2  --   --   --  26  --   --  26   BUN  --   --   --  17  --   --  14   CR  --   --   --  0.90  --   --  0.68   ANIONGAP  --   --   --  7  --   --  8   CHRIS  --   --   --  8.8  --   --  8.1*   GLC  --   --   --  203* 69*  --  127*   ALBUMIN  --   --   --  3.2*  --   --  3.2*   PROTTOTAL  --   --   --  7.0  --   --  7.0   BILITOTAL  --   --   --  1.0  --   --  0.2   ALKPHOS  --   --   --  125  --   --  122   ALT  --   --   --  40  --   --  52   AST  --   --   --  50*  --   --  86*    < > = values in this interval not displayed.       No results found for this or any previous visit (from the past 24 hour(s)).  Medications       acetaminophen  975 mg Oral TID     aspirin  81 mg Oral Daily      atorvastatin  40 mg Oral At Bedtime     buPROPion  300 mg Oral QAM     DULoxetine  60 mg Oral Daily     folic acid  1 mg Oral Daily     gabapentin  200 mg Oral TID     insulin aspart  1-10 Units Subcutaneous TID AC     insulin aspart  1-7 Units Subcutaneous At Bedtime     insulin aspart   Subcutaneous TID AC     insulin glargine  40 Units Subcutaneous At Bedtime     magnesium oxide  1,200 mg Oral BID     metoprolol tartrate  25 mg Oral BID     multivitamin w/minerals  1 tablet Oral Daily     nicotine  1 patch Transdermal Daily     nicotine   Transdermal Q8H     nicotine   Transdermal Daily     sennosides  1 tablet Oral BID     tamsulosin  0.4 mg Oral QAM

## 2019-08-05 NOTE — PROGRESS NOTES
Patient refused bed alarm. He does not want to call for help to get out of bed. He says he transfers himself at rehab. Nurse explained risks of transferring himself alone.

## 2019-08-06 PROBLEM — M79.672 LEFT FOOT PAIN: Status: ACTIVE | Noted: 2019-08-06

## 2019-08-06 LAB
GLUCOSE BLDC GLUCOMTR-MCNC: 211 MG/DL (ref 70–99)
GLUCOSE BLDC GLUCOMTR-MCNC: 221 MG/DL (ref 70–99)
GLUCOSE BLDC GLUCOMTR-MCNC: 265 MG/DL (ref 70–99)
GLUCOSE BLDC GLUCOMTR-MCNC: 297 MG/DL (ref 70–99)
GLUCOSE BLDC GLUCOMTR-MCNC: 345 MG/DL (ref 70–99)

## 2019-08-06 PROCEDURE — 25000132 ZZH RX MED GY IP 250 OP 250 PS 637: Performed by: HOSPITALIST

## 2019-08-06 PROCEDURE — 99232 SBSQ HOSP IP/OBS MODERATE 35: CPT | Performed by: INTERNAL MEDICINE

## 2019-08-06 PROCEDURE — 12000000 ZZH R&B MED SURG/OB

## 2019-08-06 PROCEDURE — 25000132 ZZH RX MED GY IP 250 OP 250 PS 637: Performed by: INTERNAL MEDICINE

## 2019-08-06 PROCEDURE — 25000131 ZZH RX MED GY IP 250 OP 636 PS 637: Performed by: INTERNAL MEDICINE

## 2019-08-06 PROCEDURE — 25000132 ZZH RX MED GY IP 250 OP 250 PS 637: Performed by: PSYCHIATRY & NEUROLOGY

## 2019-08-06 PROCEDURE — G0378 HOSPITAL OBSERVATION PER HR: HCPCS

## 2019-08-06 PROCEDURE — 96376 TX/PRO/DX INJ SAME DRUG ADON: CPT

## 2019-08-06 PROCEDURE — 99222 1ST HOSP IP/OBS MODERATE 55: CPT | Performed by: PSYCHIATRY & NEUROLOGY

## 2019-08-06 PROCEDURE — 00000146 ZZHCL STATISTIC GLUCOSE BY METER IP

## 2019-08-06 RX ORDER — DIVALPROEX SODIUM 500 MG/1
500 TABLET, EXTENDED RELEASE ORAL EVERY EVENING
Status: DISCONTINUED | OUTPATIENT
Start: 2019-08-06 | End: 2019-08-07

## 2019-08-06 RX ADMIN — TAMSULOSIN HYDROCHLORIDE 0.4 MG: 0.4 CAPSULE ORAL at 09:03

## 2019-08-06 RX ADMIN — MAGNESIUM OXIDE TAB 400 MG (241.3 MG ELEMENTAL MG) 1200 MG: 400 (241.3 MG) TAB at 21:35

## 2019-08-06 RX ADMIN — HYDROMORPHONE HYDROCHLORIDE 1 MG: 2 TABLET ORAL at 21:36

## 2019-08-06 RX ADMIN — ACETAMINOPHEN 975 MG: 325 TABLET, FILM COATED ORAL at 21:35

## 2019-08-06 RX ADMIN — HYDROMORPHONE HYDROCHLORIDE 1 MG: 2 TABLET ORAL at 01:38

## 2019-08-06 RX ADMIN — GABAPENTIN 300 MG: 300 CAPSULE ORAL at 09:02

## 2019-08-06 RX ADMIN — MAGNESIUM OXIDE TAB 400 MG (241.3 MG ELEMENTAL MG) 1200 MG: 400 (241.3 MG) TAB at 09:02

## 2019-08-06 RX ADMIN — INSULIN GLARGINE 45 UNITS: 100 INJECTION, SOLUTION SUBCUTANEOUS at 21:37

## 2019-08-06 RX ADMIN — ACETAMINOPHEN 975 MG: 325 TABLET, FILM COATED ORAL at 16:03

## 2019-08-06 RX ADMIN — DULOXETINE HYDROCHLORIDE 60 MG: 60 CAPSULE, DELAYED RELEASE ORAL at 09:03

## 2019-08-06 RX ADMIN — GABAPENTIN 300 MG: 300 CAPSULE ORAL at 16:03

## 2019-08-06 RX ADMIN — ASPIRIN 81 MG: 81 TABLET, COATED ORAL at 09:02

## 2019-08-06 RX ADMIN — NICOTINE 1 PATCH: 7 PATCH, EXTENDED RELEASE TRANSDERMAL at 22:09

## 2019-08-06 RX ADMIN — HYDROMORPHONE HYDROCHLORIDE 1 MG: 2 TABLET ORAL at 05:35

## 2019-08-06 RX ADMIN — ACETAMINOPHEN 975 MG: 325 TABLET, FILM COATED ORAL at 09:02

## 2019-08-06 RX ADMIN — BUPROPION HYDROCHLORIDE 300 MG: 300 TABLET, FILM COATED, EXTENDED RELEASE ORAL at 09:03

## 2019-08-06 RX ADMIN — MULTIPLE VITAMINS W/ MINERALS TAB 1 TABLET: TAB at 09:02

## 2019-08-06 RX ADMIN — METOPROLOL TARTRATE 25 MG: 25 TABLET, FILM COATED ORAL at 21:36

## 2019-08-06 RX ADMIN — ATORVASTATIN CALCIUM 40 MG: 40 TABLET, FILM COATED ORAL at 21:37

## 2019-08-06 RX ADMIN — METOPROLOL TARTRATE 25 MG: 25 TABLET, FILM COATED ORAL at 09:02

## 2019-08-06 RX ADMIN — DIVALPROEX SODIUM 500 MG: 500 TABLET, EXTENDED RELEASE ORAL at 21:36

## 2019-08-06 RX ADMIN — FOLIC ACID 1 MG: 1 TABLET ORAL at 09:03

## 2019-08-06 RX ADMIN — GABAPENTIN 300 MG: 300 CAPSULE ORAL at 22:09

## 2019-08-06 ASSESSMENT — ACTIVITIES OF DAILY LIVING (ADL)
ADLS_ACUITY_SCORE: 18
ADLS_ACUITY_SCORE: 18

## 2019-08-06 NOTE — PLAN OF CARE
Alert and oriented x 4. Refuses bed alarm. Voids in urinal. BGs 164 and a74.  Nicotine patch.  PO dilaudid and tylenol for left foot pain. Discharge pending placement.

## 2019-08-06 NOTE — PROGRESS NOTES
Pt is AOx4, CMS intact, VSS, pain is somewhat managed with dilaudid per patient, assist 1-2 to BSC. Pt is calm, cooperative overnight. Pending placement.

## 2019-08-06 NOTE — PLAN OF CARE
Pt A/Ox4. VSS. Up 2 assist pivot transfer to wheelchair. Reports pain 4/10 using PO dilaudid and tylenol with relief. Mod CHO diet tolerated,  and 265, pt refused evening BG already eating. Voiding adequately. Baseline neuropathy to LLE.

## 2019-08-06 NOTE — CONSULTS
[facility] Initial Psychiatric Consult Note      TIME SPENT IN PSYCHIATRY INITIAL CONSULT: 55 MINUTES    Consult ordered by: Mark Townsend MD  Reason: alcohol dependence      Initial History     The patient's care was discussed, patient seen and chart notes were reviewed.    Patient examined for psychiatric consultation.     IDENTIFICATION  Pt is a 59 year old single male who currently lives in a resident facility. Pt sees PCP Dr. Becca Jean-Baptiste-Primary, Physician. Pt does not currently see a therapist or psychiatrist. Pt seen on 8/06/19 for an initial psychiatric consultation.    HISTORY OF PRESENT ILLNESS  Patient obtains previous psychiatric diagnoses that include major depression, bipolar disorder, and alcohol use disorder. He has attended chemical dependency treatment in the past and has multiple previous ED visits related to his alcohol use. Patient was initially presented to [facility] ED on 7/31/19 after becoming aggressive and agitated while at his care facility. Apparently the patient had been ordering alcohol through an online service and had been having it delivered to the care facility. It was also noted that patient had dumped urine on the floor, implied unwanted sexual advances against another resident, and ran into others with his wheelchair while at this facility. Psychiatry was consulted to address patients alcohol use along with patients escalating behavior. On interview, the patient was awake and alert in bed. Dr. Horn first confronts the patient about his alcohol use, asking about the online alcohol ordering and delivering. The patient becomes irritable and denies this ever happening. He questions where Dr. Horn had gotten that information from. Patient believes his alcohol use is under control and appropriate since he obtains chronic pain. Dr. Horn reminds patient that he is sabotaging his medical care and treatment by consuming alcohol and that it's very necessary that patient  seek sobriety. If patient is unwilling to do so, a petition for commitment will be considered. Dr. Horn will order a chemical dependency consult. In addition to this, Dr. Horn would like to start the patient on the medication Depakote to help stabilize patients agitated and irritable mood. After much information is presented to patient, he gives verbal consent to try Depakote, thus Dr. Horn will order Depakote 500mg at bedtime. Psychiatry will follow-up with patient tomorrow to see how patient responds to this medication. If he tolerates it well, the dose may be increased to 1,000mg at bedtime tomorrow.     CHEMICAL DEPENDENCY HISTORY  Patient obtains a chemical dependency history significant of alcohol use. He has attended chemical dependency treatment in the past, however has been unable to obtain full sobriety. Patient has multiple ED visits related to his alcohol use. Majority of visits entailed patient falling out of his wheel chair while intoxicated.     PAST PSYCHIATRIC HISTORY  It is not suspected that patient attains any previous inpatient mental health hospitalizations. Patient does not currently meet with a therapist or psychiatrist. Previous psychiatric medications include: Seroquel, Effexor.  He denies ever undergoing ECT psychiatric treatment.     FAMILY HISTORY  Mother - alcoholism, depression     SOCIAL HISTORY  Patient grew up in [place], [place] with 4 brothers. He was primarily raised by both parents, however when he was in 9th or 10th grade his mother had left their family. His mother left to live in [place], [place] without her children. 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 He last worked in 2002 after his father passed away. Patient has never been  and does not have any children. He is currently a resident at a care facility.      Medications     Medications Prior to Admission   Medication Sig Dispense Refill Last Dose      acetaminophen (TYLENOL) 325 MG tablet Take 975 mg by mouth 3 times daily   7/30/2019 at Unknown time     aspirin 81 MG EC tablet Take 81 mg by mouth daily   7/30/2019 at Unknown time     atorvastatin (LIPITOR) 40 MG tablet Take 40 mg by mouth At Bedtime   7/30/2019 at Unknown time     buPROPion (WELLBUTRIN XL) 300 MG 24 hr tablet Take 300 mg by mouth every morning   7/30/2019 at Unknown time     DULoxetine (CYMBALTA) 60 MG capsule Take 60 mg by mouth daily   7/30/2019 at Unknown time     gabapentin (NEURONTIN) 100 MG capsule Take by mouth 3 times daily Tapering off as follows:   7/31-8/6: 500mg TID  8/7-8/13: 400mg TID  8/14-8/20: 300mg TID  8/21-8/27: 200mg TID  8/28-9/3: 100mg tid then stop   7/30/2019 at Unknown time     insulin aspart (NOVOLOG FLEXPEN) 100 UNIT/ML pen Inject 18 Units Subcutaneous 3 times daily (with meals) HOLD if BG <150 or if he does not eat   7/30/2019 at Unknown time     insulin glargine (LANTUS PEN) 100 UNIT/ML pen Inject 52 Units Subcutaneous At Bedtime   7/30/2019 at Unknown time     magnesium oxide (MAG-OX) 400 MG tablet Take 1,200 mg by mouth 2 times daily   7/30/2019 at Unknown time     metoprolol tartrate (LOPRESSOR) 25 MG tablet Take 25 mg by mouth 2 times daily   7/30/2019 at Unknown time     nitroGLYcerin (NITROSTAT) 0.4 MG sublingual tablet Place 0.4 mg under the tongue every 5 minutes as needed for chest pain For chest pain place 1 tablet under the tongue every 5 minutes for 3 doses. If symptoms persist 5 minutes after 1st dose call 911.   prn     polyethylene glycol (MIRALAX/GLYCOLAX) packet Take 1 packet by mouth daily as needed for constipation   prn     senna-docusate (SENOKOT-S/PERICOLACE) 8.6-50 MG tablet Take 1 tablet by mouth 2 times daily as needed for constipation   prn     tamsulosin (FLOMAX) 0.4 MG capsule Take 0.4 mg by mouth every morning   7/30/2019 at Unknown time     vitamin D3 (CHOLECALCIFEROL) 400 units capsule Take 800 Units by mouth daily   7/30/2019 at  Unknown time     warfarin (COUMADIN) 3 MG tablet Take 3 mg by mouth Mon, Tues, Thurs, Fri, Sat, Sun   7/30/2019     warfarin (COUMADIN) 4 MG tablet Take 4 mg by mouth every 7 days Wednesday 7/24/2019       Scheduled Medications    acetaminophen  975 mg Oral TID     aspirin  81 mg Oral Daily     atorvastatin  40 mg Oral At Bedtime     buPROPion  300 mg Oral QAM     DULoxetine  60 mg Oral Daily     folic acid  1 mg Oral Daily     gabapentin  300 mg Oral TID     insulin aspart  1-10 Units Subcutaneous TID AC     insulin aspart  1-7 Units Subcutaneous At Bedtime     insulin aspart   Subcutaneous TID AC     insulin glargine  40 Units Subcutaneous At Bedtime     magnesium oxide  1,200 mg Oral BID     metoprolol tartrate  25 mg Oral BID     multivitamin w/minerals  1 tablet Oral Daily     nicotine  1 patch Transdermal Daily     nicotine   Transdermal Q8H     nicotine   Transdermal Daily     sennosides  1 tablet Oral BID     tamsulosin  0.4 mg Oral QAM     PRNs:  acetaminophen, acetaminophen, glucose **OR** dextrose **OR** glucagon, hydrALAZINE, HYDROmorphone, melatonin, naloxone, nitroGLYcerin, ondansetron **OR** ondansetron, polyethylene glycol      Allergies        Allergies   Allergen Reactions     Penicillins         Previous Medical History     Past Medical History:   Diagnosis Date     Anxiety      Atrial fibrillation (H)      Bipolar 1 disorder (H)      CAD (coronary artery disease)      Chronic back pain      Chronic diastolic heart failure (H)      Compartment syndrome (H)      Diabetes (H)      DVT (deep vein thrombosis) in pregnancy (H)      Gastroesophageal reflux disease      GI bleeding      Gout      Hyperlipidemia      Hypertension      Insomnia      Neuropathy      PVD (peripheral vascular disease) (H)      Substance abuse (H)      TBI (traumatic brain injury) (H)      Uncomplicated asthma         Medical Review of Systems     /62 (BP Location: Right arm)   Pulse 59   Temp 97.8  F (36.6  C)  (Oral)   Resp 16   Wt 78.6 kg (173 lb 3.2 oz)   SpO2 96%   There is no height or weight on file to calculate BMI.    Previous 10-point ROS completed by Aleksandr Hicks MD on 7/31/19 reviewed by Lobito Horn MD on August 6, 2019 and is unchanged except for those problems mentioned within the HPI.      Mental Status Examination     Appearance Lying in bed, dressed in gown. Appears stated age.   Attitude Cooperative   Orientation Oriented to person, place, time   Eye Contact Good   Speech Regular rate, rhythm, volume and tone   Language Normal   Psychomotor Behavior Normal   Thought Process Goal-Oriented, Intact   Associations Intact   Thought Content Patient is currently negative for suicidal ideation, negative for plan or intent, able to contract no self harm and identify barriers to suicide.  Negative for obsessions, compulsions or psychosis.     Mood Depressed    Affect Flat   Fund of Knowledge Intact   Insight Poor   Judgement Poor   Attention Span & Concentration Intact    Recent & Remote Memory Intact   Gait Normal   Muscle Tone Intact      Labs     Labs reviewed.  Recent Results (from the past 24 hour(s))   Glucose by meter    Collection Time: 08/05/19  2:56 PM   Result Value Ref Range    Glucose 207 (H) 70 - 99 mg/dL   Glucose by meter    Collection Time: 08/05/19  5:54 PM   Result Value Ref Range    Glucose 164 (H) 70 - 99 mg/dL   Glucose by meter    Collection Time: 08/05/19  9:25 PM   Result Value Ref Range    Glucose 174 (H) 70 - 99 mg/dL   Glucose by meter    Collection Time: 08/06/19  1:29 AM   Result Value Ref Range    Glucose 345 (H) 70 - 99 mg/dL   Glucose by meter    Collection Time: 08/06/19  6:13 AM   Result Value Ref Range    Glucose 211 (H) 70 - 99 mg/dL   Glucose by meter    Collection Time: 08/06/19  8:04 AM   Result Value Ref Range    Glucose 221 (H) 70 - 99 mg/dL        Impression   This is a 59 year old single male with previous psychiatric diagnoses of major depression,  alcohol use disorder, and bipolar disorder. He was initially presented to [facility] ED on 7/31/19 after patient became increasingly aggressive and agitated at his care facility. Apparently the patient had been ordering alcohol through an online service and had it delivered to his care facility. It was also noted that patient had dumped urine on the floor, implied unwanted sexual advances against another resident, and ran into others with his wheelchair while at this facility. Psychiatry was consulted to address the patient alcohol use along with his escalating behavior. While meeting with Dr. Horn this afternoon, the patient appeared depressed and somewhat irritable in mood. Patient was able to admit to drinking alcohol regularly, however minimized his problem and actions, denying his alcohol use being an issue at this time. Dr. Horn has ordered a chemical dependency consult and highly encouraged the patient to astray from minimizing his alcohol use when meeting with . It is crucial that patient seek abstinence from alcohol. His alcohol abuse is a barrier to his medical treatments, particularly in regards of his diabetes. In addition to this, to address patients irritable and agitated mood, Dr. Horn has started the patient on Depakote 500mg at bedtime. If patient tolerates this mediation well, it may be increased tomorrow to 1,000mg at bedtime. This medication should also aid in patients reported severe and chronic pain.      Diagnoses     1. Bipolar Disorder, type 2  2. Alcohol Use Disorder      Plan     1. Explained side effects, benefits and complications of medications to the patient.  2. Medication Changes: Started Depakote 500mg at bedtime   3. Increase Depakote dose to 1,000mg at bedtime tomorrow if patient tolerates initial dose   4. Discussed treatment plan with patient and team.  5. Re-consult Psychiatry as needed  6. Ordered Chemical Dependency consult    TIME SPENT IN PSYCHIATRY  INITIAL CONSULT: 55 MINUTES     Attestation:   I, Cuca Moore, am serving as a scribe on 8/6/2019 to document services personally performed by Lobito Horn MD based on my observations and the provider's statements to me.    Patient ID:  Name: Placido Hinton MRN: [id number]  Admission: 7/31/2019 YOB: 1960

## 2019-08-06 NOTE — PROGRESS NOTES
DANY  D: Met with pt to discuss discharge plan as [facility] will not take pt back. Per consultation with administration, pt can either appeal discharge with assistance from  or pt can go to a different facility. Pt decided he would go to another facility. Pt wanted to go to [facility] in [place]. DANY explained she will put referral out to [facility] but pt's alcohol use may be a barrier to finding a facility. Pt was understanding of this and was open to SW sending additional referrals. DANY will send additional referrals to [facility], and [facility]. DANY called and left  for housing relocation worker ([phone]) to look for additional assistance in finding pt housing.   P: Will continue to follow for discharge planning    Kathie Oleary MSW, [facility]     ADDENDUM @8396: Received call back from Anita housing relocation worker with [facility]. DANY provided update of current situation. Anita recommended trying [facility] as she works with many clients over there. DANY will put referral out via DOD. Anita will send email to [facility] to provide a heads up on the referral and that she is working with him. DANY attempted to call [facility] but number was busy. DANY will try to call again later to discuss referral.     ADDENDUM @7249: [facility] called-they are not able to accept him for admission due to alcohol use. DANY will wait to hear back from other TCU's regarding referral.

## 2019-08-06 NOTE — PROGRESS NOTES
[facility]    Medicine Progress Note - Hospitalist Service       Date of Admission:  7/31/2019  Assessment & Plan   Placido Hinton is a 59-year-old M admitted 7/31/2019 with aggressive behavior in the setting of inadequate pain control and increasing alcohol consumption.    Acute on chronic left foot pain  Peripheral vascular disease s/p right AKA and L fem-pop bypass, gangrenous L great toe now amputated  Followed with Dr. Harvey as recently as 5/20/19. Vascular surgery consulted this admission due to complaint of rest pain.  CHRISTA of left leg showed mild arterial insufficiency, left lower extremity arterial duplex demonstrated patent left femoral to popliteal artery bypass, hypoechoic collection around the graft possibly representing a hematoma.  No further intervention recommended per vascular surgery.  - Continue gabapentin 300 mg TID. His facility reports that the reason for gabapentin taper was because of his constant alcohol use, he is more agreeable to abstain from alcohol and hopefully can continue to titrate this up to minimize opioid needs  - Continue hydromorphone PRN, wean as tolerated as gabapentin is titrated   - Continue outpatient follow-up with vascular surgery   - Continue aspirin, atorvastatin  - He reports he has an appointment for what sounds to be a spinal steroid injection 8/9    Aggressive behavior  Multiple behavioral disturbances reported by facility including dumping urine on the floor, unwanted sexual advances against another resident, running into others at his facility with his wheelchair. 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 The patient acknowledged that he dumped his urine out on the floor because they did not empty it fast enough at his care facility. He also voiced displeasure about the management of his pain medications.  - Psychiatry consulted, appreciate assistance  - Depakote  mg every evening started per psychiatry, plan to  increase to 1000 mg every evening 8/7/19 if tolerated     Alcohol dependence  Former heavier drinker, per nursing home reports he has been accelerating his alcohol intake via online delivery service to the [facility], he reports due to his left foot pain.  Mild hepatitis noted on labs. No history of withdrawal reported. CIWA scores stable and has not required treatment, discontinued CIWA protocol  - Chemical dependency consulted     History of DVT     - Holding warfarin in anticipation of spinal injection 8/9/19     Diabetes mellitus, type 2 with diabetic neuropathy  HgbA1c most recently in 10-11 range. Prior to admission on glargine 52 units at bedtime, prandial aspart 18 units. Reports occasional lows in the 50s-60s at nursing home on this regimen.   - Blood sugars elevated, though improving with increased doses. Increase glargine to 45 units at bedtime, continue prandial 1 unit per 5 grams of carbohydrates, continue sliding scale insulin at high protocol     Diet: Moderate Consistent CHO Diet  Room Service    DVT Prophylaxis: Warfarin  Sequeira Catheter: not present  Code Status: Full Code      Disposition Plan   Expected discharge: Continues to require initiation and adjustment of pain and psychiatric medications, will admit to inpatient status. Social work working on discharge location.   Entered: Mark Townsend MD 08/06/2019, 2:17 PM       The patient's care was discussed with the Patient and SW    Mark Townsend MD  Hospitalist Service  [facility]    ______________________________________________________________________    Interval History   No acute events overnight. Continues to have episodes of sharp pain in left foot. Eating/drinking well. Denies any chest pain or shortness of breath. Met with psychiatry and he is more open to eliminating his alcohol use.     Data reviewed today: I reviewed all medications, new labs and imaging results over the last 24 hours. I personally reviewed no images  or EKG's today.    Physical Exam   Vital Signs: Temp: 97.7  F (36.5  C) Temp src: Oral BP: 126/80 Pulse: 63 Heart Rate: 63 Resp: 16 SpO2: 96 % O2 Device: None (Room air)    Weight: 173 lbs 3.2 oz    Constitutional:   NAD  Respiratory: Clear to auscultation bilaterally, good air movement bilaterally  Cardiovascular: RRR, no m/r/g.   GI: Soft, non-tender, non-distended.   Skin/Integumen: Warm, dry  Neuro/Psych: Alert.  Responding to questions appropriately.  Calm and cooperative.    Data   Recent Labs   Lab 08/05/19  0700 08/04/19  0633 08/03/19  0651 08/02/19  0728 08/01/19  0643  07/31/19  1850   WBC  --   --   --   --   --   --  6.0   HGB  --   --   --   --   --   --  14.7   MCV  --   --   --   --   --   --  103*   PLT  --   --   --   --   --   --  259   INR 1.47* 2.43* 4.58* 3.50* 2.18*   < >  --    NA  --   --   --  137  --   --  141   POTASSIUM  --   --   --  4.1  --   --  3.8   CHLORIDE  --   --   --  104  --   --  107   CO2  --   --   --  26  --   --  26   BUN  --   --   --  17  --   --  14   CR  --   --   --  0.90  --   --  0.68   ANIONGAP  --   --   --  7  --   --  8   CHRIS  --   --   --  8.8  --   --  8.1*   GLC  --   --   --  203* 69*  --  127*   ALBUMIN  --   --   --  3.2*  --   --  3.2*   PROTTOTAL  --   --   --  7.0  --   --  7.0   BILITOTAL  --   --   --  1.0  --   --  0.2   ALKPHOS  --   --   --  125  --   --  122   ALT  --   --   --  40  --   --  52   AST  --   --   --  50*  --   --  86*    < > = values in this interval not displayed.       No results found for this or any previous visit (from the past 24 hour(s)).  Medications       acetaminophen  975 mg Oral TID     aspirin  81 mg Oral Daily     atorvastatin  40 mg Oral At Bedtime     buPROPion  300 mg Oral QAM     divalproex sodium extended-release  500 mg Oral QPM     DULoxetine  60 mg Oral Daily     folic acid  1 mg Oral Daily     gabapentin  300 mg Oral TID     insulin aspart  1-10 Units Subcutaneous TID AC     insulin aspart  1-7 Units  Subcutaneous At Bedtime     insulin aspart   Subcutaneous TID AC     insulin glargine  40 Units Subcutaneous At Bedtime     magnesium oxide  1,200 mg Oral BID     metoprolol tartrate  25 mg Oral BID     multivitamin w/minerals  1 tablet Oral Daily     nicotine  1 patch Transdermal Daily     nicotine   Transdermal Q8H     nicotine   Transdermal Daily     sennosides  1 tablet Oral BID     tamsulosin  0.4 mg Oral QAM

## 2019-08-07 VITALS
HEART RATE: 60 BPM | TEMPERATURE: 98 F | RESPIRATION RATE: 16 BRPM | DIASTOLIC BLOOD PRESSURE: 73 MMHG | WEIGHT: 173.2 LBS | OXYGEN SATURATION: 98 % | SYSTOLIC BLOOD PRESSURE: 122 MMHG

## 2019-08-07 LAB
GLUCOSE BLDC GLUCOMTR-MCNC: 112 MG/DL (ref 70–99)
GLUCOSE BLDC GLUCOMTR-MCNC: 227 MG/DL (ref 70–99)

## 2019-08-07 PROCEDURE — 25000132 ZZH RX MED GY IP 250 OP 250 PS 637: Performed by: INTERNAL MEDICINE

## 2019-08-07 PROCEDURE — 00000146 ZZHCL STATISTIC GLUCOSE BY METER IP

## 2019-08-07 PROCEDURE — 99239 HOSP IP/OBS DSCHRG MGMT >30: CPT | Performed by: INTERNAL MEDICINE

## 2019-08-07 PROCEDURE — 25000132 ZZH RX MED GY IP 250 OP 250 PS 637: Performed by: HOSPITALIST

## 2019-08-07 RX ORDER — HYDROMORPHONE HYDROCHLORIDE 2 MG/1
1 TABLET ORAL EVERY 4 HOURS PRN
Qty: 10 TABLET | Refills: 0 | Status: SHIPPED | OUTPATIENT
Start: 2019-08-07

## 2019-08-07 RX ORDER — GABAPENTIN 300 MG/1
300 CAPSULE ORAL 3 TIMES DAILY
DISCHARGE
Start: 2019-08-07

## 2019-08-07 RX ORDER — DIVALPROEX SODIUM 500 MG/1
1000 TABLET, EXTENDED RELEASE ORAL EVERY EVENING
Status: DISCONTINUED | OUTPATIENT
Start: 2019-08-07 | End: 2019-08-07 | Stop reason: HOSPADM

## 2019-08-07 RX ORDER — DIVALPROEX SODIUM 500 MG/1
1000 TABLET, EXTENDED RELEASE ORAL EVERY EVENING
DISCHARGE
Start: 2019-08-07

## 2019-08-07 RX ORDER — SENNOSIDES 8.6 MG
1 TABLET ORAL 2 TIMES DAILY
DISCHARGE
Start: 2019-08-07

## 2019-08-07 RX ADMIN — ASPIRIN 81 MG: 81 TABLET, COATED ORAL at 08:37

## 2019-08-07 RX ADMIN — ACETAMINOPHEN 975 MG: 325 TABLET, FILM COATED ORAL at 08:37

## 2019-08-07 RX ADMIN — MULTIPLE VITAMINS W/ MINERALS TAB 1 TABLET: TAB at 08:37

## 2019-08-07 RX ADMIN — GABAPENTIN 300 MG: 300 CAPSULE ORAL at 08:36

## 2019-08-07 RX ADMIN — MAGNESIUM OXIDE TAB 400 MG (241.3 MG ELEMENTAL MG) 1200 MG: 400 (241.3 MG) TAB at 08:37

## 2019-08-07 RX ADMIN — DULOXETINE HYDROCHLORIDE 60 MG: 60 CAPSULE, DELAYED RELEASE ORAL at 08:37

## 2019-08-07 RX ADMIN — HYDROMORPHONE HYDROCHLORIDE 1 MG: 2 TABLET ORAL at 08:37

## 2019-08-07 RX ADMIN — HYDROMORPHONE HYDROCHLORIDE 1 MG: 2 TABLET ORAL at 03:01

## 2019-08-07 RX ADMIN — TAMSULOSIN HYDROCHLORIDE 0.4 MG: 0.4 CAPSULE ORAL at 08:37

## 2019-08-07 RX ADMIN — FOLIC ACID 1 MG: 1 TABLET ORAL at 08:37

## 2019-08-07 RX ADMIN — METOPROLOL TARTRATE 25 MG: 25 TABLET, FILM COATED ORAL at 08:37

## 2019-08-07 RX ADMIN — BUPROPION HYDROCHLORIDE 300 MG: 300 TABLET, FILM COATED, EXTENDED RELEASE ORAL at 08:37

## 2019-08-07 ASSESSMENT — ACTIVITIES OF DAILY LIVING (ADL)
ADLS_ACUITY_SCORE: 18

## 2019-08-07 NOTE — PROGRESS NOTES
DANY  D: Received call from [facility]-they can accept pt for admission. Paged MD for orders.     Kathie Oleary MSW, LGSW     ADDENDUM @1205: Met with pt to discuss going to [facility] for LTC. Pt was in agreement with going to [facility]. Discussed transport. Pt would like ride set-up. Pt was concerned about getting his belongings from [facility]. SW agreed to contact them to get his belongings sent over to [facility]. SW will call HE and arrange a w/c ride.     ADDENDUM @1400: Arranged transport via  w/c at 1515. Faxed orders, scripts to [facility]. Called and left VM for [facility] on discharge time. Called and updated Anita, housing worker on discharge plan/time. She will be in contact with pt once he goes to [facility]. Received email response from [facility] that they will get his belongings to him at [facility].     PAS-RR    D: Per DHS regulation, SW completed and submitted PAS-RR to [place] [employer] Direct [employer] via the [employer].  PAS-RR confirmation # is : [id number]    I: SW spoke with pt and they are aware a PAS-RR has been submitted.  SW reviewed with pt that they may be contacted for a follow up appointment within 10 days of hospital discharge if their SNF stay is < 30 days.  Contact information for [employer] was also provided.    A: Pt verbalized understanding.    P: Further questions may be directed to [employer] at #[phone], option #4 for PAS-RR staff.

## 2019-08-07 NOTE — PROGRESS NOTES
[facility]    Medicine Progress Note - Hospitalist Service       Date of Admission:  7/31/2019  Assessment & Plan   Placido Hinton is a 59-year-old M admitted 7/31/2019 with aggressive behavior in the setting of inadequate pain control and increasing alcohol consumption.    Acute on chronic left foot pain  Peripheral vascular disease s/p right AKA and L fem-pop bypass, gangrenous L great toe now amputated  Followed with Dr. Harvey as recently as 5/20/19. Vascular surgery consulted this admission due to complaint of rest pain.  CHRISTA of left leg showed mild arterial insufficiency, left lower extremity arterial duplex demonstrated patent left femoral to popliteal artery bypass, hypoechoic collection around the graft possibly representing a hematoma.  No further intervention recommended per vascular surgery.  - Continue gabapentin 300 mg TID. His facility reports that the reason for gabapentin taper was because of his constant alcohol use, he is more agreeable to abstain from alcohol and plan to continue to titrate this up to minimize opioid needs  - Continue hydromorphone PRN, wean as tolerated as gabapentin is titrated   - Continue outpatient follow-up with vascular surgery   - Continue aspirin, atorvastatin  - He reports he has an appointment for what sounds to be a spinal steroid injection 8/9    Aggressive behavior  Multiple behavioral disturbances reported by facility including dumping urine on the floor, unwanted sexual advances against another resident, running into others at his facility with his wheelchair. 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 The patient acknowledged that he dumped his urine out on the floor because they did not empty it fast enough at his care facility. He also voiced displeasure about the management of his pain medications.  - Psychiatry consulted, appreciate assistance  - Depakote  mg every evening started per psychiatry 8/6, tolerated  "well, will increase to 1000 mg every evening 8/7/19     Alcohol dependence  Former heavier drinker, per nursing home reports he has been accelerating his alcohol intake via online delivery service to the [facility]/NH, he reports due to his left foot pain.  Mild hepatitis noted on labs. No history of withdrawal reported. CIWA scores stable and has not required treatment, discontinued CIWA protocol  - Chemical dependency consulted     History of DVT     - Holding warfarin in anticipation of spinal injection 8/9/19     Diabetes mellitus, type 2 with diabetic neuropathy  HgbA1c most recently in 10-11 range. Prior to admission on glargine 52 units at bedtime, prandial aspart 18 units. Reports occasional lows in the 50s-60s at nursing home on this regimen.   - Blood sugars remain elevated. Increase glargine to 50 units at bedtime, increase prandial aspart to 1 unit per 4 grams of carbohydrates, continue sliding scale insulin at high protocol     Diet: Moderate Consistent CHO Diet  Room Service    DVT Prophylaxis: Warfarin  Sequeira Catheter: not present  Code Status: Full Code      Disposition Plan   Expected discharge: Social work assisting with new facility.   Entered: Mark Townsend MD 08/07/2019, 8:07 AM       The patient's care was discussed with the Patient      Mark Townsend MD  Hospitalist Service  [facility]    ______________________________________________________________________    Interval History   No acute events overnight. Reports yesterday was a \"good day\" for pain in foot. Denies any headache, nausea, or other apparent side effects from Depakote. Denies any chest pain or shortness of breath.     Data reviewed today: I reviewed all medications, new labs and imaging results over the last 24 hours. I personally reviewed no images or EKG's today.    Physical Exam   Vital Signs: Temp: 97.9  F (36.6  C) Temp src: Oral BP: 109/61 Pulse: 66 Heart Rate: 66 Resp: 14 SpO2: 100 % O2 Device: None (Room " air)    Weight: 173 lbs 3.2 oz    Constitutional:   NAD  Respiratory: Clear to auscultation bilaterally, good air movement bilaterally  Cardiovascular: RRR, no m/r/g.   GI: Soft, non-tender, non-distended.   Skin/Integumen: Warm, dry  Neuro/Psych: Alert.  Responding to questions appropriately.  Calm and cooperative.    Data   Recent Labs   Lab 08/05/19  0700 08/04/19  0633 08/03/19  0651 08/02/19  0728 08/01/19  0643  07/31/19  1850   WBC  --   --   --   --   --   --  6.0   HGB  --   --   --   --   --   --  14.7   MCV  --   --   --   --   --   --  103*   PLT  --   --   --   --   --   --  259   INR 1.47* 2.43* 4.58* 3.50* 2.18*   < >  --    NA  --   --   --  137  --   --  141   POTASSIUM  --   --   --  4.1  --   --  3.8   CHLORIDE  --   --   --  104  --   --  107   CO2  --   --   --  26  --   --  26   BUN  --   --   --  17  --   --  14   CR  --   --   --  0.90  --   --  0.68   ANIONGAP  --   --   --  7  --   --  8   CHRIS  --   --   --  8.8  --   --  8.1*   GLC  --   --   --  203* 69*  --  127*   ALBUMIN  --   --   --  3.2*  --   --  3.2*   PROTTOTAL  --   --   --  7.0  --   --  7.0   BILITOTAL  --   --   --  1.0  --   --  0.2   ALKPHOS  --   --   --  125  --   --  122   ALT  --   --   --  40  --   --  52   AST  --   --   --  50*  --   --  86*    < > = values in this interval not displayed.       No results found for this or any previous visit (from the past 24 hour(s)).  Medications       acetaminophen  975 mg Oral TID     aspirin  81 mg Oral Daily     atorvastatin  40 mg Oral At Bedtime     buPROPion  300 mg Oral QAM     divalproex sodium extended-release  500 mg Oral QPM     DULoxetine  60 mg Oral Daily     folic acid  1 mg Oral Daily     gabapentin  300 mg Oral TID     insulin aspart  1-10 Units Subcutaneous TID AC     insulin aspart  1-7 Units Subcutaneous At Bedtime     insulin aspart   Subcutaneous TID AC     insulin glargine  50 Units Subcutaneous At Bedtime     magnesium oxide  1,200 mg Oral BID     metoprolol  tartrate  25 mg Oral BID     multivitamin w/minerals  1 tablet Oral Daily     nicotine  1 patch Transdermal Daily     nicotine   Transdermal Q8H     nicotine   Transdermal Daily     sennosides  1 tablet Oral BID     tamsulosin  0.4 mg Oral QAM

## 2019-08-07 NOTE — PLAN OF CARE
Pt A/Ox4. VSS. Up 1 assist. Reports pain 8/10 using PO dilaudid with relief. Regular diet tolerated. CMS intact except baseline numbness in LLE. Pt refused to wash up x2, NA notified nurse. Voiding adequately. Discharge to LTC with [facility] wheelchair at 1515. AVS reviewed with pt, pack with script sent with  at discharge.

## 2019-08-07 NOTE — PLAN OF CARE
VSS, up with 2 assist, taking po dilaudid for pain, voiding adequate amount per urinal, discharge pending waiting for placement. Will continue to monitor.

## 2019-10-10 ENCOUNTER — TELEPHONE (OUTPATIENT)
Dept: OTHER | Facility: CLINIC | Age: 59
End: 2019-10-10

## 2019-10-10 NOTE — TELEPHONE ENCOUNTER
I called [facility] back, transferred to RN three times, first time I was disconnected, second time phone had continuous ring with no answer.     Called and spoke to pts nurse Bonnie, she was just starting shift and unable to provide information re pt foot/toe changes. Unable to verify if pt needs transportation setup or not.  Pt appt set for tomorrow 10/11/19 with Dr. Harvey to assess pt.     Alma Roberson, CARLEYN, RN

## 2019-10-10 NOTE — TELEPHONE ENCOUNTER
Patient's care center called to schedule a follow up with Dr. Harvey for changes with his foot and toes. His care team can be reached at [phone]    Pt had surgery with Dr. Harvey at [facility] on 11/26/18 but wants to follow up at [facility]. Imaging from 8/1/19 is in Epic.    Pt was scheduled on 10/18/19 with Dr. Harvey. Routing to nurse for any imaging that may be needed.

## 2019-10-11 NOTE — TELEPHONE ENCOUNTER
I called [facility], spoke to Jenn, she was unaware pt had appt today and transporation has not been coordinated. I offered appt early next week, Jenn requested we keep pts appt on Friday 10-18-19 as was previously scheduled. I explained if pts nurse there has further concerns about foot/toes to call us, if severe then pt to present to ER. Jenn notes understanding.     Alma Roberson, CARLEYN, RN

## 2019-10-18 ENCOUNTER — OFFICE VISIT (OUTPATIENT)
Dept: OTHER | Facility: CLINIC | Age: 59
End: 2019-10-18
Attending: SURGERY
Payer: COMMERCIAL

## 2019-10-18 VITALS
SYSTOLIC BLOOD PRESSURE: 88 MMHG | DIASTOLIC BLOOD PRESSURE: 59 MMHG | RESPIRATION RATE: 16 BRPM | HEART RATE: 60 BPM | WEIGHT: 173 LBS

## 2019-10-18 DIAGNOSIS — I70.229 CRITICAL LOWER LIMB ISCHEMIA (H): Primary | ICD-10-CM

## 2019-10-18 PROCEDURE — 99212 OFFICE O/P EST SF 10 MIN: CPT | Mod: ZP | Performed by: SURGERY

## 2019-10-18 PROCEDURE — G0463 HOSPITAL OUTPT CLINIC VISIT: HCPCS

## 2019-10-18 NOTE — PROGRESS NOTES
Vascular Surgery Clinic Note     Is a 59-year-old male has a history of a left femoral to below-knee popliteal artery bypass that occluded again and presented to [facility] on 10/12/2019 underwent left lower extremity thrombolysis.  He had gangrenous changes of the second and third toes and pain.  They successfully open the graft however he was seen by vascular surgery and was discussed today potentially need revision of the bypass graft.  The patient was then somehow scheduled to see me in clinic after all this despite having a vascular surgery appointment at [facility] today as well.  I discussed the findings through care everywhere with the patient I am not able to see images of his recent thrombolysis.  With hand-held Doppler is got strong monophasic signal in both the posterior tibial and dorsalis pedis arteries in the left foot.  The toes are ischemic in early gangrenous changes.  There is no signs of overt infection.   The patient is frustrated with going back and forth between facilities and I discussed that I think his best to be receive his care at one location.  Since he is scheduled to see podiatry and vascular surgery at [facility] I have asked him to follow-up here for continuity of care and given his recent to thrombolysis attempts were done there imaging would be easy to access in this location.  He agrees and wants to follow-up at each [facility] [place].  I can see him back in the future as needed if he needs additional services however I think he would be best served by getting all of his care at [facility] at this time.  The patient is happy with this plan he will call if he has any additional issues.  I spent 10 minutes of face-to-face time, > 50% spent counseling and coordinating care.       Joseph Harvey MD  Vascular Surgery

## 2019-10-18 NOTE — PROGRESS NOTES
Placido Hinton is a 59 year old male who presents for:  Chief Complaint   Patient presents with     RECHECK     F/u with Dr. Havrey for changes in foot and toes. Dr. Harvey saw pt at [facility]. *mao        Vitals:    Vitals:    10/18/19 1048   BP: (!) 88/59   BP Location: Left arm   Patient Position: Chair   Cuff Size: Adult Regular   Pulse: 60   Resp: 16   Weight: 173 lb (78.5 kg)       BMI:  There is no height or weight on file to calculate BMI.    Pain Score:  Data Unavailable        Ana Lamar, CMA

## 2021-05-24 ENCOUNTER — RECORDS - HEALTHEAST (OUTPATIENT)
Dept: ADMINISTRATIVE | Facility: CLINIC | Age: 61
End: 2021-05-24

## 2021-05-26 ENCOUNTER — RECORDS - HEALTHEAST (OUTPATIENT)
Dept: ADMINISTRATIVE | Facility: CLINIC | Age: 61
End: 2021-05-26

## 2021-05-28 NOTE — PROGRESS NOTES
HPI: 58-year-old male who history of a right AKA and left femoropopliteal bypass who presented several months ago with left acute limb ischemia and rest pain and gangrenous first toe.  He presented then to [facility] and under basis and graft that was occluded and successful revascularization of the left limb.  He then proceeded undergo amputation of the left toe and this is successfully healed.  He continues to not have rest pain in the left foot his ABIs today look reasonable at the ankle.  If he needed a bypass or surgical intervention his preference is to do this at [facility].      Allergies, Medications, Social History, Past Medical History and Past Surgical History were reviewed and are noted in the chart.     Review of Systems - Negative.     Vitals:    05/20/19 0855   BP: 130/80   Pulse: 64   Resp: 14        EXAM:  GENERAL: This is a well developed male in no distress.  In a motorized scooter.  HEAD AND NECK: Cranial nerves intact. No neck masses or bruits.  CHEST/LUNG: Clear  GROINS: Both femoral pulse palpable.  EXTREM: Right AKA stump is unchanged.   Left lower leg 1st toe amputation site is well healed.  The left lower leg has old scars from fasciotomy sites.  The foot is warm and pink with good perfusion.      IMAGES:      CHRISTA is 0.90 at the ankle.        Assessment/Plan: 58-year-old male with a history of a left femoropopliteal recanalization thrombolyzed in the left lower extremity with a left great toe amputation for critical limb ischemia.     He is currently on Coumadin and a baby aspirin daily.  I have asked him to continue these medications indefinitely. Placido will follow up at [facility] with vascular surgery for surveillance of his bypass and symptoms.   He prefers this plan as [facility] is much closer to his current place of residence.  He will call sooner if he develops any issues in the meantime.     Total time spent 15 minutes face to face with patient with more than 50% time spent in counseling and coordination  of care.      Joseph Harvey MD  Vascular Surgery

## 2021-05-28 NOTE — PROGRESS NOTES
History of a left femoropopliteal recanalization thrombolyzed in the left lower extremity with a left great toe amputation for critical limb ischemia. No wounds. R AKA He is currently on Coumadin and a baby aspirin daily.

## 2021-05-29 ENCOUNTER — RECORDS - HEALTHEAST (OUTPATIENT)
Dept: ADMINISTRATIVE | Facility: CLINIC | Age: 61
End: 2021-05-29

## 2021-05-30 ENCOUNTER — RECORDS - HEALTHEAST (OUTPATIENT)
Dept: ADMINISTRATIVE | Facility: CLINIC | Age: 61
End: 2021-05-30

## 2021-05-31 ENCOUNTER — RECORDS - HEALTHEAST (OUTPATIENT)
Dept: ADMINISTRATIVE | Facility: CLINIC | Age: 61
End: 2021-05-31

## 2021-06-01 ENCOUNTER — RECORDS - HEALTHEAST (OUTPATIENT)
Dept: ADMINISTRATIVE | Facility: CLINIC | Age: 61
End: 2021-06-01

## 2021-06-01 VITALS — HEIGHT: 68 IN | BODY MASS INDEX: 23.54 KG/M2

## 2021-06-01 VITALS
WEIGHT: 154.8 LBS | BODY MASS INDEX: 23.46 KG/M2 | HEIGHT: 68 IN | WEIGHT: 154.8 LBS | BODY MASS INDEX: 23.46 KG/M2 | HEIGHT: 68 IN

## 2021-06-01 VITALS — BODY MASS INDEX: 23.64 KG/M2 | HEIGHT: 68 IN | WEIGHT: 156 LBS

## 2021-06-02 ENCOUNTER — RECORDS - HEALTHEAST (OUTPATIENT)
Dept: ADMINISTRATIVE | Facility: CLINIC | Age: 61
End: 2021-06-02

## 2021-06-02 VITALS — BODY MASS INDEX: 23.86 KG/M2 | HEIGHT: 68 IN

## 2021-06-17 NOTE — PATIENT INSTRUCTIONS - HE
Patient Instructions by Vinita Chandler RN at 2/4/2019  2:40 PM     Author: Vinita Chandler RN Service: -- Author Type: Registered Nurse    Filed: 2/4/2019  2:35 PM Encounter Date: 2/4/2019 Status: Signed    : Vinita Chandler RN (Registered Nurse)       Ankle-Brachial Index (CHRISTA) or Physiologic Test    Description  An ankle-brachial index test is relatively pain free. Blood pressure cuffs of various sizes are placed on your thigh, calf, foot and toes.  Similar to having your blood pressure checked with an arm cuff, as the technician inflates the cuffs, they progressively tighten and are then quickly released.  You may feel some discomfort, but generally for less than 60 seconds for each measurement. You will be asked to remove your socks and shoes and possibly your pants or shorts. Gowns will be provided. It usually takes about 30-60 minutes.   Depending on the initial readings and patient symptoms, you may be asked to perform a light walk on a treadmill.  The technician will apply ultrasound gel, usually warmed for your comfort, to your ankles and wrists. Through the gel, the technician will use a small hand-held device that emits sound waves.  Risks  There are typically no side effects or complications associated with a physiologic study.  How to Prepare  Eat and take medications as usual.  There is no preparation required for an ankle-brachial index (CHRISTA) or physiologic exam.  What Can I Expect After the Test?  The technician will send the ultrasound images to your vascular surgeon for evaluation. Typically, a report is available in 2-3 days. If anything critical is found, it is standard practice to notify the vascular surgeon immediately.  Reference: https://vascular.org/patient-resources/vascular-tests    Lower Extremity Arterial Ultrasound    Description  Ultrasound examinations are painless and easy for the patient. The vascular laboratory will contain a bed and just two or three pieces of equipment.  You will be asked to remove pants or shorts and gowns will be provided. It usually takes about 30 minutes.  The technician will tuck a towel under your underpants in the groin. The gel is water-soluble and will not stain your skin or clothes.  Ultrasound gel, usually warmed for your comfort, will be placed on the inner side of your legs.    Through the gel, the technician will apply to your legs a small hand-held device that emits sound waves.  When the test is completed, the technician will remove excess gel from your legs.    Risks  There are typically no side effects or complications associated with a lower extremity arterial duplex ultrasound.  How to Prepare  Eat and take medications as usual.  There is no preparation required for a lower extremity arterial duplex ultrasound.  What Can I Expect After the Test?  The technician will send the ultrasound images to your vascular surgeon for evaluation. Typically, a report is available in 2-3 days. If anything critical is found, it is standard practice to notify the vascular surgeon immediately.  Reference: https://vascular.org/patient-resources/vascular-tests

## 2021-06-19 NOTE — ANESTHESIA POSTPROCEDURE EVALUATION
Patient: Placido Hinton  CORONARY ARTERY BYPASS GRAFT X2, RIGHT RADIAL ENDOSCOPTIC VEIN HARVEST, ANESTHESIA, TRANSESOPHAGEAL ECHO   Anesthesia type: general    Patient location: cvicu  Last vitals:   Vitals:    07/07/18 0845   BP: 133/71   Pulse: 95   Resp: 21   Temp: 36.9  C (98.4  F)   SpO2: 93%     Post vital signs: stable  Level of consciousness: awake and responds to simple questions  Post-anesthesia pain: pain controlled  Post-anesthesia nausea and vomiting: no  Pulmonary: unassisted, return to baseline  Cardiovascular: stable and blood pressure at baseline  Hydration: adequate  Anesthetic events: no    QCDR Measures:  ASA# 11 - Marissa-op Cardiac Arrest: ASA11B - Patient did NOT experience unanticipated cardiac arrest  ASA# 12 - Marissa-op Mortality Rate: ASA12B - Patient did NOT die  ASA# 13 - PACU Re-Intubation Rate: ASA13X - Exclusion: organ donor or direct ICU transfer  ASA# 10 - Composite Anes Safety: ASA10A - No serious adverse event    Additional Notes:  Patient extubated and stable.  No anesthetic issues

## 2021-06-19 NOTE — ANESTHESIA PROCEDURE NOTES
WILL    Patient location during procedure: OR  Start time: 7/6/2018 7:39 AM  Staffing:  Performing  Anesthesiologist: LAZARO MAYER  WILL:  Type/Reason: Monitoring WILL  Technique: blind insertion  Difficulty: easy  Anesthesia Monitoring: see additional note

## 2021-06-19 NOTE — ANESTHESIA CARE TRANSFER NOTE
Last vitals:   Vitals:    07/06/18 1234   BP:    Pulse: (!) 109   Resp: 15   Temp: 37.3  C (99.2  F)   SpO2: 100%     Patient's level of consciousness is unresponsive  Spontaneous respirations: no: On a vent.  See the RT note.  Maintains airway independently: no: ET tube is in place.  Dentition unchanged: yes  Oropharynx: endotracheal tube in place and oral gastric tube in place    QCDR Measures:  ASA# 20 - Surgical Safety Checklist: WHO surgical safety checklist completed prior to induction  PQRS# 430 - Adult PONV Prevention: 4558F-1P - Medical reason for NOT administering combination therapy  ASA# 8 - Peds PONV Prevention: NA - Not pediatric patient, not GA or 2 or more risk factors NOT present  PQRS# 424 - Marissa-op Temp Management: 4559F - At least one body temp DOCUMENTED => 35.5C or 95.9F within required timeframe  PQRS# 426 - PACU Transfer Protocol:NA - Patient did not go to PACU  ASA# 14 - Acute Post-op Pain: ASA14B - Patient did NOT experience pain >= 7 out of 10

## 2021-06-19 NOTE — ANESTHESIA PREPROCEDURE EVALUATION
"Anesthesia Evaluation      Patient summary reviewed     Airway   Mallampati: III  Neck ROM: limited   Pulmonary    (+) asthma  decreased breath sounds, a smoker                         Cardiovascular   Exercise tolerance: < 4 METS  (+) hypertension, CAD, angina at rest, , hypercholesterolemia,     Rhythm: regular  Rate: normal,      ROS comment: Echo   \" When compared to the images of 11/8/2012, ischemic size is mildly increased   Lexiscan stress ECG is negative for inducible myocardial ischemia.   Lexiscan stress nuclear study is positive for inducible myocardial ischemia in inferior wall from mid to distal segments and distal inferolateral segment. No previous myocardial infarction.   Normal left ventricular size, wall motion and function. The calculated left ventricular ejection fraction is 64%.  Comments: The patient has low to intermediate risk for cardiac events in the future.\"      Neuro/Psych    (+) neuromuscular disease (diabetic peripheral neuropathy),  depression,     Comments: Carotid ultrasound shows moderate atheromatous plaque bilaterally without evidence of stenosis.    Has been living in a nursing facility since March 2018.  Before this he was drinking alcohol quite heavily and smoking a pack per day.    Endo/Other    (+) diabetes mellitus type 2 poorly controlled, arthritis,      Comments: S/p BKA    GI/Hepatic/Renal    (+) GERD,        Other findings: Alcohol Abuse     Essential hypertension    Esophageal reflux    Esophageal Erosion    Muscle Weakness Generalized    Coronary Artery Disease    Lower limb ischemia    Tobacco use    Depressive disorder, not elsewhere classified    Cognitive disorder    Gout    Skin graft disorder    Chronic pancreatitis (H)    Vascular graft occlusion (H)    Pancreatitis    Leg graft occlusion (H)    Unilateral AKA, right (H)    Chronic diastolic heart failure (H)    Anxiety    Bipolar 1 disorder (H)    Chronic back pain    Asthma    Melena    Chronic " pt disconected from all IV infusions. PIV saline locked. anemia    Constipation due to opioid therapy    Chest pain at rest    Atherosclerosis of native coronary artery of native heart with angina pectoris (H)    Chest pain    Abnormal nuclear stress test    S/P CABG x 3     S/p AKA, right      Dental    (+) upper dentures and poor dentition                       Anesthesia Plan  Planned anesthetic: general endotracheal  Place central line prior to induction  Norepinephrine inline for induction    Ketamine 50 mg IV  Methadone 20 mg IV after induction  Magnesium 1 gram/hour x 4 hours  Precedex    Glidescope    WILL for monitoring    Discussed slightly increased risk of intraoperative awareness during cardiac surgery, also discussed the risk of dental and esophageal trauma with WILL and ETT.  ASA 4   Induction: intravenous   Anesthetic plan and risks discussed with: patient  Anesthesia plan special considerations: video-assisted, antiemetics, CVP line, arterial catheterization, pulmonary artery catheterization, IV therapy two IVs, dexmedetomidine  Post-op plan: routine recovery and extended intubation/vent support (ICU per protocol)

## 2021-06-19 NOTE — ANESTHESIA PROCEDURE NOTES
Arterial Line  Reason for Procedure: hemodynamic monitoring and multiple ABGs  Patient location during procedure: OR pre-induction  Start time: 7/6/2018 6:53 AM  End time: 7/6/2018 6:59 AM  Staffing:  Performing  Anesthesiologist: LAZARO MAYER  Sterile Precautions:  sterile barriers used during insertion: cap, mask, sterile gloves, large sheet, and hand hygiene used.  Arterial Line:   Immediately prior to procedure a time out was called to verify the correct patient, procedure, equipment, support staff and site/side marked as required  Laterality: left  Location: radial  Prepped with: ChloroPrep    Needle gauge: 20 G  Number of Attempts: 1  Secured with: tape, transparent dressing and pressure dressing  Flushed with: saline  1% lidocaine local anesthesia used for skin prep.   See MAR for additional medications given.  Ultrasound evaluation of access site: yes  Vessel patent by US exam    Concurrent real time visualization of needle entry

## 2021-06-19 NOTE — ANESTHESIA PROCEDURE NOTES
Peripheral IV start    Reason for Procedure: Other (comment) (Replacement IV)  Staffing:  Performing  Performing CRNA: ANIAL ANNE  IV:   Laterality: left  Location: forearm  Prepped with: ChloroPrep  lidocaine 1% local anesthesia used for local skin prep  Needle gauge: 18 G  Number of Attempts: 1  Secured with: transparent dressing and tape  Flushed with: saline

## 2021-06-19 NOTE — ANESTHESIA PROCEDURE NOTES
Central line    Start time: 7/6/2018 7:05 AM  End time: 7/6/2018 7:20 AM  Patient location: OR Post-induction  Indications: central pressure monitoring and vascular access  Performing Anesthesiologist: LAZARO MAYER  Pre-procedure Checklist  Completed: patient identified, site marked, risks, benefits, and alternatives discussed, timeout performed, consent obtained, hand hygiene performed, all elements of maximal sterile barriers used including cap, mask, gown, sterile gloves, and large sheet and skin prep agent completely dried prior to procedure    Procedure Details:  Lidocaine 1% local anesthesia used for skin prep  Preparation: 2% chlorhexidine  Location details: right internal jugular  Catheter type: Introducer with [place]  Introducer type: MAC  Lumens:double lumenNumber of attempts: 1  Ultrasound evaluation of access site: yes  Vessel patent by US exam  Concurrent real time visualization of needle entryTransduced for venous waveform  manometry confirmation of venous access    Post-procedure:   line sutured and Antimicrobial disks with CHG applied  Assessment: blood return through all ports and free fluid flow  Complications: none

## 2021-06-20 NOTE — PROGRESS NOTES
Thank you for asking the [facility] team to see lPacido Hinton .      Assessment/Plan:   Threatened left foot with no palpable pedal pulses and none auscultated on duplex. The toes are all cold to touch and ruberous. There are components that seem similar to gout, like the extreme tenderness to touch, but the lack of pulses is very concerning, especially since he had three vessel run off by angiogram in March.     Will send to the ER for furhter evaluation.     From a cardiac standpoint he is doing well and will plan f/u in 3 months.  He is due for a lipid check.     Current History:   Placido Hinton is a 58 y.o. with CAD s/p CABG with LIMA to LAD and radial graft to OM in July 2018. No graft to the occluded RCA due to lack of conduit. He also has complex peripheral history with interventions of the left common femoral artery, last in March 2018 at [facility] (care everywhere). Wei does not have a primary care doctor and is not getting regular care with the doctor at his living facility. He comes to cardiology today for f/u of his CABG and denies chest pain, LH, palpitations, PND or orthopnea, but does get short of breath with transfers. He notes this dyspnea is vastly improved compared to his pre-op state.     He complains of pain in the left lower leg and foot. He has had it for years but it has intensified significantly over the past two weeks. He is no longer able to bear any weight and the ankle is exquisitely tender to palpation.     Past Medical History:     Past Medical History:   Diagnosis Date     Alcohol abuse     with history of alcohol induced pancreatitis     Anxiety      Asthma      Bipolar 1 disorder (H)      Chronic back pain      Chronic diastolic heart failure (H) 10/13/2015     Compartment syndrome (H)      DVT (deep venous thrombosis) (H)      Essential hypertension     Created by Conversion  Replacement Utility updated for latest IMO load     GERD (gastroesophageal reflux disease)      GI  bleeding     secondary to erosive esophagitis     Gout      Hyperlipidemia      Insomnia      Neuropathy (H)      Peripheral vascular disease (H)     s/p left femoral bypass in 2009     TBI (traumatic brain injury) (H) 1978     Type 2 diabetes mellitus with diabetic polyneuropathy (H)     Created by Conversion      Unilateral AKA, right (H) 10/13/2015     Vascular graft occlusion (H) 4/1/2015       Past Surgical History:     Past Surgical History:   Procedure Laterality Date     ABOVE KNEE LEG AMPUTATION Right 4/10/2015    Procedure: RIGHT LEG FASCIOTOMY ;  Surgeon: Niles Spaulding MD;  Location: [facility] OR;  Service:      BELOW KNEE LEG AMPUTATION Right 4/14/2015    Procedure:  RIGHT - AMPUTATION BELOW KNEE;  Surgeon: Niles Spaulding MD;  Location: [facility] OR;  Service:      COLONOSCOPY N/A 2/1/2016    Procedure: COLONOSCOPY;  Surgeon: Alex Peña MD;  Location: [facility] GI;  Service:      CV CORONARY ANGIOGRAM N/A 7/3/2018    Procedure: Coronary Angiogram;  Surgeon: Isai Salomon MD;  Location: [facility] Cath Lab;  Service:      ESOPHAGOGASTRODUODENOSCOPY N/A 1/11/2016    Procedure: ESOPHAGOGASTRODUODENOSCOPY;  Surgeon: Joshua Sibley MD;  Location: [facility] GI;  Service:      FASCIOTOMY      left leg     ME CABG, ARTERY-VEIN, THREE N/A 7/6/2018    Procedure: CORONARY ARTERY BYPASS GRAFT X2, RIGHT RADIAL ENDOSCOPTIC VEIN HARVEST, ANESTHESIA, TRANSESOPHAGEAL ECHO ;  Surgeon: Luda Allred MD;  Location: [facility] OR;  Service: Cardiovascular     ME VEIN BYPASS GRAFT,FEM-POP      Description: Bypass Graft Using Vein: Femoral-popliteal;  Recorded: 12/26/2012;     ROTATOR CUFF REPAIR Right      TUMOR REMOVAL      Neck       Family History:     Family History   Problem Relation Age of Onset     Alcohol abuse Mother      Diabetes Brother      CABG Father        Social History:    reports that he has been smoking Cigarettes.  He has a 20.00 pack-year smoking  history. He has never used smokeless tobacco. He reports that he drinks about 12.0 oz of alcohol per week  He reports that he does not use illicit drugs.    Meds:     Current Outpatient Prescriptions   Medication Sig Note     acetaminophen (TYLENOL) 325 MG tablet Take 2 tablets (650 mg total) by mouth every 4 (four) hours as needed for pain or fever.      amiodarone (PACERONE) 200 MG tablet Take 1 tablet (200 mg total) by mouth daily.      ascorbic acid (VITAMIN C) 500 MG tablet Take 500 mg by mouth 2 (two) times a day.      aspirin 81 MG EC tablet Take 81 mg by mouth daily.      atorvastatin (LIPITOR) 80 MG tablet Take 80 mg by mouth bedtime.      b complex vitamins tablet Take 1 tablet by mouth bedtime.       blood sugar diagnostic (GLUCOSE BLOOD) Strp Simran Contour Test In Vitro Strip test 3 times daily      buPROPion (WELLBUTRIN XL) 150 MG 24 hr tablet Take 150 mg by mouth daily.      docusate sodium (COLACE) 100 MG capsule Take 100 mg by mouth daily.      DULoxetine (CYMBALTA) 60 MG capsule Take 60 mg by mouth daily.      ergocalciferol (ERGOCALCIFEROL) 50,000 unit capsule Take 50,000 Units by mouth once a week. 7/2/2018: On Tuesdays     gabapentin (NEURONTIN) 600 MG tablet Take 600 mg by mouth 3 (three) times a day.      insulin aspart U-100 (NOVOLOG) 100 unit/mL injection Inject 20 Units under the skin 3 (three) times a day before meals.      insulin glargine (LANTUS) 100 unit/mL injection Inject 20 Units under the skin at bedtime.       LANCETS MISC Use As Directed.      magnesium oxide (MAG-OX) 400 mg tablet Take 1,200 mg by mouth 2 (two) times a day.       metoprolol tartrate (LOPRESSOR) 25 MG tablet Take 1 tablet (25 mg total) by mouth 2 (two) times a day.      nitroglycerin (NITROSTAT) 0.4 MG SL tablet Place 0.4 mg under the tongue every 5 (five) minutes as needed for chest pain.      nortriptyline (PAMELOR) 25 MG capsule Take 50 mg by mouth at bedtime.       oxyCODONE (ROXICODONE) 5 MG immediate release  "tablet Take 0.5-1 tablets (2.5-5 mg total) by mouth every 8 (eight) hours as needed for pain.      polyethylene glycol (MIRALAX) 17 gram packet Take 1 packet (17 g total) by mouth daily as needed.      tamsulosin (FLOMAX) 0.4 mg Cp24 Take 0.4 mg by mouth Daily after breakfast.      therapeutic multivitamin (THERAGRAN) tablet Take 1 tablet by mouth every evening.       citalopram (CELEXA) 20 MG tablet Take 20 mg by mouth daily.      diltiazem (CARDIZEM) 30 MG tablet Take 0.5 tablets (15 mg total) by mouth 4 (four) times a day for 30 doses.      warfarin (COUMADIN) 2 MG tablet Check INR daily as need to regulate INR, starting 7/12/2018 and report to PCP for instructions on Coumadin dosing, To Target INR 2-3 for H/o clot in graft for PVD.        Allergies:   Penicillins    Review of Systems:   Review of Systems:   General: WNL  Eyes: WNL  Ears/Nose/Throat: WNL  Lungs: WNL  Heart: WNL  Stomach: WNL  Bladder: WNL  Muscle/Joints: WNL  Skin: WNL  Nervous System: WNL  Mental Health: WNL     Blood: WNL       Objective:      Physical Exam  @LASTENCWT:3@  5' 8\" (1.727 m)  @BMI:3@  BP (!) 82/60 (Patient Site: Left Arm, Patient Position: Sitting, Cuff Size: Adult Regular)  Pulse 60  Resp 16  Ht 5' 8\" (1.727 m)  Wt 156 lb (70.8 kg)  BMI 23.72 kg/m2    General Appearance:   Alert, cooperative and in no acute distress.   HEENT:  No scleral icterus; the mucous membranes were pink and moist.   Neck: JVP flat. No thyromegaly. No HJR   Chest: The spine was straight. The chest was symmetric.   Lungs:   Respirations unlabored; the lungs are clear to auscultation.   Cardiovascular:   S1 and S2 normal and without murmur. No clicks or rubs. No carotid bruits noted. Left pedal pulses not palpable and not heard by duplex. Red toes, cool to touch. Coolness extends mid calf on down.    Abdomen:  No organomegaly, masses, bruits, or tenderness. Bowels sounds are present   Extremities: No edema. Right leg amputation.   Skin: No xanthelasma. "   Neurologic: Mood and affect are appropriate.         Lab Review   Lab Results   Component Value Date     (L) 07/09/2018     (L) 07/08/2018     (L) 07/07/2018    K 4.5 07/11/2018    K 4.2 07/10/2018    K 4.2 07/09/2018    K 4.2 07/09/2018    CL 97 (L) 07/09/2018    CL 98 07/08/2018     07/07/2018    CO2 23 07/09/2018    CO2 24 07/08/2018    CO2 19 (L) 07/07/2018    BUN 13 07/09/2018    BUN 8 07/08/2018    BUN 8 07/07/2018    CREATININE 0.73 07/09/2018    CREATININE 0.63 (L) 07/08/2018    CREATININE 0.59 (L) 07/07/2018    CALCIUM 9.4 07/09/2018    CALCIUM 9.6 07/08/2018    CALCIUM 9.1 07/07/2018     Lab Results   Component Value Date    WBC 11.8 (H) 07/09/2018    WBC 13.7 (H) 07/08/2018    WBC 14.3 (H) 07/08/2018    WBC 10.9 04/17/2015    WBC 12.0 (H) 04/16/2015    WBC 14.8 (H) 04/15/2015    HGB 9.3 (L) 07/11/2018    HGB 10.1 (L) 07/10/2018    HGB 9.2 (L) 07/10/2018    HCT 22.2 (L) 07/09/2018    HCT 24.9 (L) 07/08/2018    HCT 24.9 (L) 07/08/2018    MCV 96 07/09/2018    MCV 95 07/08/2018    MCV 93 07/08/2018     07/10/2018     07/09/2018     07/08/2018     Lab Results   Component Value Date    CHOL 161 06/13/2013    CHOL 143 04/26/2013    CHOL 105 01/24/2013    TRIG 146 06/13/2013    TRIG 156 (H) 01/24/2013    TRIG 256 (H) 11/09/2012    HDL 27 (L) 06/13/2013    HDL 25 (L) 01/24/2013    HDL 26 (L) 11/09/2012    LDLDIRECT 79.5 04/26/2013    LDLDIRECT 132 (H) 06/29/2012     Lab Results   Component Value Date     (H) 07/01/2018    BNP 55 (H) 11/06/2012    BNP 34 01/30/2011         Sol Cisse M.D.

## 2021-06-20 NOTE — PROGRESS NOTES
"  Assessment:       1. S/P CABG x 2 with LIMA-LAD, right radial artery-OM, right ERH on 07/06/2018          Plan:   S/P CABG x 2 with LIMA-LAD, right radial artery-OM, right ERH on 07/06/2018  Postop atrial fibrillation  Discharge to TCU on July 11, 2018 and still resides there  Presents today for postop CV surgery follow-up  Denies incisional chest pain on or overt shortness of breath, incisions are well approximated  Lung sounds clear and no sternal instability appreciated  Appetite is good, bowel movement is regular, and patient voids without difficulties  Patient saw his PCP the house doctor at the TCU weekly  He saw Paige Watson CNP in A. fib clinic on August 10, 2018  Does in-facility cardiac rehab at the TCU and is tolerating well  Scheduled to see his primary cardiologist Dr. Betito MD on August 24, 2018  Patient may restart driving anytime from now  Patient is otherwise doing well and not need any further CV surgery follow-up, however patient is advised to call with questions or concerns    BP 90/60 (Patient Site: Left Arm, Patient Position: Sitting, Cuff Size: Adult Regular)  Pulse 62  Temp 98.2  F (36.8  C) (Oral)   Resp 24  Ht 5' 8\" (1.727 m)  Wt 156 lb (70.8 kg)  BMI 23.72 kg/m2    Current Outpatient Prescriptions   Medication Sig Note     acetaminophen (TYLENOL) 325 MG tablet Take 2 tablets (650 mg total) by mouth every 4 (four) hours as needed for pain or fever.      amiodarone (PACERONE) 200 MG tablet Take 1 tablet (200 mg total) by mouth daily.      ascorbic acid (VITAMIN C) 500 MG tablet Take 500 mg by mouth 2 (two) times a day.      aspirin 81 MG EC tablet Take 81 mg by mouth daily.      atorvastatin (LIPITOR) 80 MG tablet Take 80 mg by mouth bedtime.      b complex vitamins tablet Take 1 tablet by mouth bedtime.       blood sugar diagnostic (GLUCOSE BLOOD) Strp Simran Contour Test In Vitro Strip test 3 times daily      buPROPion (WELLBUTRIN XL) 150 MG 24 hr tablet Take 150 mg by mouth " daily.      citalopram (CELEXA) 20 MG tablet Take 20 mg by mouth daily.      docusate sodium (COLACE) 100 MG capsule Take 100 mg by mouth daily.      DULoxetine (CYMBALTA) 60 MG capsule Take 60 mg by mouth daily.      ergocalciferol (ERGOCALCIFEROL) 50,000 unit capsule Take 50,000 Units by mouth once a week. 7/2/2018: On Tuesdays     gabapentin (NEURONTIN) 600 MG tablet Take 600 mg by mouth 3 (three) times a day.      insulin aspart U-100 (NOVOLOG) 100 unit/mL injection Inject 20 Units under the skin 3 (three) times a day before meals.      insulin glargine (LANTUS) 100 unit/mL injection Inject 20 Units under the skin at bedtime.       LANCETS MISC Use As Directed.      magnesium oxide (MAG-OX) 400 mg tablet Take 1,200 mg by mouth 2 (two) times a day.       metoprolol tartrate (LOPRESSOR) 25 MG tablet Take 1 tablet (25 mg total) by mouth 2 (two) times a day.      nitroglycerin (NITROSTAT) 0.4 MG SL tablet Place 0.4 mg under the tongue every 5 (five) minutes as needed for chest pain.      nortriptyline (PAMELOR) 25 MG capsule Take 50 mg by mouth at bedtime.       oxyCODONE (ROXICODONE) 5 MG immediate release tablet Take 0.5-1 tablets (2.5-5 mg total) by mouth every 8 (eight) hours as needed for pain.      polyethylene glycol (MIRALAX) 17 gram packet Take 1 packet (17 g total) by mouth daily as needed.      tamsulosin (FLOMAX) 0.4 mg Cp24 Take 0.4 mg by mouth Daily after breakfast.      therapeutic multivitamin (THERAGRAN) tablet Take 1 tablet by mouth every evening.       warfarin (COUMADIN) 2 MG tablet Check INR daily as need to regulate INR, starting 7/12/2018 and report to PCP for instructions on Coumadin dosing, To Target INR 2-3 for H/o clot in graft for PVD.      diltiazem (CARDIZEM) 30 MG tablet Take 0.5 tablets (15 mg total) by mouth 4 (four) times a day for 30 doses.            Subjective:        Patient ID: Placido Hinton is a 58 y.o. male.    Chief Complaint:  HPI  The following portions of the patient's  history were reviewed and updated as appropriate: allergies, current medications, past family history, past medical history, past social history, past surgical history and problem list.  Mr. Hinton is a 58-year-old male who underwent coronary artery bypass graft ×2 with left internal mammary artery to left anterior descending coronary artery, right radial artery to the obtuse marginal branch of left circumflex, right radial artery harvest using endoscopic radial artery harvest technique, on July 6, 2018 at [facility] by Dr. Luda Allred, assisted by resident surgeon Dr. Michoacano Lomeli.  Patient had postop atrial fibrillation that was managed with amiodarone therapy  Patient was discharged to TCU on July 11, 2018 where he resides  Who presents today for postop CV surgery evaluation.  He denies chest pain on or overt shortness of breath, incisions are clean and healing well.  Lung sounds clear, and no sternal instability is appreciated.  Appetite is good, bowel movement is regular, and patient voids without difficulties.  He saw Paige Watson CNP in A. fib clinic on August 10, 2018.  He started inpatient cardiac rehab at the TCU and is tolerating well.  Complaint of left leg neuropathy and advised to avoid too much weight bearing activities till right prothesis is fitted  Patient is also scheduled to see his primary cardiologist Dr. Sol Cisse MD on August 24, 2018.  Patient is right AKA and does not drive  Patient is about alert with very well and would not need any further CV surgery follow-up, however patient is advised to call with questions or concerns    Review of Systems   Constitution: Negative.   HENT: Negative.    Eyes: Negative.    Cardiovascular: Negative.    Respiratory: Negative.    Hematologic/Lymphatic: Negative.    Skin: Negative.    Musculoskeletal: Negative.    Gastrointestinal: Negative.    Genitourinary: Negative.    Neurological: Negative.    Psychiatric/Behavioral: Negative.            Objective:     Physical Exam   Constitutional: He appears healthy. No distress.   HENT:   Nose: Nose normal.   Mouth/Throat: Dentition is normal. Oropharynx is clear.   Eyes: Conjunctivae are normal. Pupils are equal, round, and reactive to light.   Neck: Normal range of motion and thyroid normal. Neck supple.   Pulmonary/Chest: Effort normal and breath sounds normal. He has no wheezes. He has no rales. He exhibits no tenderness.   Abdominal: Soft. Bowel sounds are normal.   Musculoskeletal: Normal range of motion.   Neurological: He is alert and oriented to person, place, and time. He has normal motor skills, normal reflexes and intact cranial nerves. Gait normal.   Skin: Skin is warm and dry.

## 2021-06-21 NOTE — PROGRESS NOTES
Pt needs to have surgery or an angiogram. Will call [facility] for more information about coumadin use and INR. Writer spoke with [facility] and his INR today was 7.2 and he is taking coumadin for a DVT. There is an NP that rounds on Tuesdays and an MD on Thursdays.

## 2021-06-21 NOTE — PROGRESS NOTES
VASCULAR SURGERY CLINIC CONSULTATION    VASCULAR SURGEON: Joseph Harvey MD    LOCATION:  [facility]    Placido Hinton   Medical Record #:  [id number]  YOB: 1960  Age:  58 y.o.     Date of Service: 11/12/2018    PRIMARY CARE PROVIDER: Provider Not in System      Reason for visit: Left great toe gangrenous changes and rest pain.    IMPRESSION: This is a 58-year-old male with extensive past medical history including hypertension, hyperlipidemia, diabetes type 2, and a history of tobacco abuse who presents with previous left femoral-popliteal artery bypass that is now occluded with critical limb ischemia and gangrenous changes of the left great toe with rest pain in the left foot.    RECOMMENDATION: I discussed with Placido that I am concerned because of the way that the foot looks and the lesion on the great toe.  Like to obtain a CT angiogram of the abdomen pelvis lower extremity runoff because I am unable to feel pulse in the right groin a previous operative site just above his above-knee amputation.  Patient has significant discomfort in the left foot and gangrenous changes of the left toe that is been present for it sounds like potentially 3 months.  He says that his foot started to bother him sometime in July after his coronary bypass grafting x2 I discussed with Placido that based on the ultrasound images he has adequate saphenous vein in the left leg for redo bypass however we need to know if we can perform an angiogram of his left lower extremity in the distribution of his disease which we will know after review of his CT angiogram today.    HPI:  Placido Hinton is a 58 y.o. male who was seen today in consultation regarding left lower extremity pain and gangrenous changes of the left toe.  The patient's medical history is very complex and includes hyperlipidemia, diabetes type 2, history of tobacco use for numerous years, hypertension, previous femoral popliteal artery bypass appears to  be possibly stents in the left common femoral artery and left popliteal artery.  He has had discomfort in the left foot he says since July 2018 he had a bypass this summer it was two-vessel bypass for significant coronary artery disease.  He is also had a previous right above-knee amputation appears to be previous surgery in the right groin he is not sure what was done.  At this point he has significant critical limb ischemia involving the foot completely occluded bypass graft on ultrasound.    [facility]:    Past Medical History:   Diagnosis Date     Alcohol abuse     with history of alcohol induced pancreatitis     Anxiety      Asthma      Bipolar 1 disorder (H)      Chronic back pain      Chronic diastolic heart failure (H) 10/13/2015     Compartment syndrome (H)      DVT (deep venous thrombosis) (H)      Essential hypertension     Created by Conversion  Replacement Utility updated for latest IMO load     GERD (gastroesophageal reflux disease)      GI bleeding     secondary to erosive esophagitis     Gout      Hyperlipidemia      Insomnia      Neuropathy (H)      Peripheral vascular disease (H)     s/p left femoral bypass in 2009     TBI (traumatic brain injury) (H) 1978     Type 2 diabetes mellitus with diabetic polyneuropathy (H)     Created by Conversion      Unilateral AKA, right (H) 10/13/2015     Vascular graft occlusion (H) 4/1/2015        Past Surgical History:   Procedure Laterality Date     ABOVE KNEE LEG AMPUTATION Right 4/10/2015    Procedure: RIGHT LEG FASCIOTOMY ;  Surgeon: Niles Spaulding MD;  Location: [facility] OR;  Service:      BELOW KNEE LEG AMPUTATION Right 4/14/2015    Procedure:  RIGHT - AMPUTATION BELOW KNEE;  Surgeon: Niles Spaulding MD;  Location: [facility] OR;  Service:      COLONOSCOPY N/A 2/1/2016    Procedure: COLONOSCOPY;  Surgeon: Alex Peña MD;  Location: [facility] GI;  Service:      CV CORONARY ANGIOGRAM N/A 7/3/2018    Procedure: Coronary Angiogram;  Surgeon:  Isai Salomon MD;  Location: [facility];  Service:      ESOPHAGOGASTRODUODENOSCOPY N/A 1/11/2016    Procedure: ESOPHAGOGASTRODUODENOSCOPY;  Surgeon: Joshua Sibley MD;  Location: [facility] GI;  Service:      FASCIOTOMY      left leg     DC CABG, ARTERY-VEIN, THREE N/A 7/6/2018    Procedure: CORONARY ARTERY BYPASS GRAFT X2, RIGHT RADIAL ENDOSCOPTIC VEIN HARVEST, ANESTHESIA, TRANSESOPHAGEAL ECHO ;  Surgeon: Luda Allred MD;  Location: [facility] OR;  Service: Cardiovascular     DC VEIN BYPASS GRAFT,FEM-POP      Description: Bypass Graft Using Vein: Femoral-popliteal;  Recorded: 12/26/2012;     ROTATOR CUFF REPAIR Right      SKIN BIOPSY       TUMOR REMOVAL      Neck       ALLERGIES:  Penicillins    MEDS:    Current Outpatient Medications:      acetaminophen (TYLENOL) 325 MG tablet, Take 2 tablets (650 mg total) by mouth every 4 (four) hours as needed for pain or fever., Disp: , Rfl: 0     amiodarone (PACERONE) 200 MG tablet, Take 1 tablet (200 mg total) by mouth daily., Disp: 30 tablet, Rfl: 1     aspirin 81 MG EC tablet, Take 1 tablet (81 mg total) by mouth daily., Disp: , Rfl: 0     atorvastatin (LIPITOR) 40 MG tablet, Take 40 mg by mouth at bedtime., Disp: , Rfl:      buPROPion (WELLBUTRIN XL) 150 MG 24 hr tablet, Take 150 mg by mouth daily., Disp: , Rfl:      docusate sodium (COLACE) 100 MG capsule, Take 100 mg by mouth daily., Disp: , Rfl:      DULoxetine (CYMBALTA) 60 MG capsule, Take 60 mg by mouth daily., Disp: , Rfl:      enoxaparin (LOVENOX) 300 mg/3 mL Soln, Inject 0.7 mL (70 mg total) under the skin every 12 (twelve) hours. Continue for 24 hours after INR is therapeutic between 2-3 , then stop, Disp: , Rfl: 0     ergocalciferol (ERGOCALCIFEROL) 50,000 unit capsule, Take 50,000 Units by mouth once a week., Disp: , Rfl:      gabapentin (NEURONTIN) 600 MG tablet, Take 600 mg by mouth 3 (three) times a day., Disp: , Rfl:      insulin aspart U-100 (NOVOLOG) 100 unit/mL  injection, Inject 38 Units under the skin 3 (three) times a day before meals. , Disp: , Rfl:      insulin glargine (LANTUS) 100 unit/mL injection, Inject 30 Units under the skin at bedtime. , Disp: , Rfl:      magnesium oxide (MAG-OX) 400 mg tablet, Take 1,200 mg by mouth 2 (two) times a day. , Disp: , Rfl:      metoprolol tartrate (LOPRESSOR) 25 MG tablet, Take 1 tablet (25 mg total) by mouth 2 (two) times a day. Hold for SBP<100, Disp: , Rfl: 3     nitroglycerin (NITROSTAT) 0.4 MG SL tablet, Place 0.4 mg under the tongue every 5 (five) minutes as needed for chest pain., Disp: , Rfl:      nortriptyline (PAMELOR) 25 MG capsule, Take 50 mg by mouth at bedtime. , Disp: , Rfl:      oxyCODONE (ROXICODONE) 5 MG immediate release tablet, Take 1 tablet (5 mg total) by mouth every 8 (eight) hours as needed for pain., Disp: 2 tablet, Rfl: 0     tamsulosin (FLOMAX) 0.4 mg Cp24, Take 0.4 mg by mouth Daily after breakfast., Disp: , Rfl:      warfarin (COUMADIN) 2.5 MG tablet, Take 2 tablets (5 mg total) by mouth See Admin Instructions. Taking 5 mg for today, 5 mg tomorrow 8/29, then check INR on 8/30, adjust the dose accordingly,, Disp: , Rfl: 0    SOCIAL HABITS:    Social History     Tobacco Use   Smoking Status Current Every Day Smoker     Packs/day: 0.50     Years: 40.00     Pack years: 20.00     Types: Cigarettes   Smokeless Tobacco Never Used       Social History     Substance and Sexual Activity   Alcohol Use Yes     Alcohol/week: 12.0 oz     Types: 20 Shots of liquor per week    Comment: 3 Liquor beverages a day - History of alcohol abuse, alcoholic pancreatitis       Social History     Substance and Sexual Activity   Drug Use No       FAMILY HISTORY:    Family History   Problem Relation Age of Onset     Alcohol abuse Mother      Diabetes Brother      CABG Father        REVIEW OF SYSTEMS:    A 12 point ROS was reviewed and except for what is listed in the HPI above, all others are negative    PE:  There were no vitals  taken for this visit.  Wt Readings from Last 1 Encounters:   08/28/18 156 lb 14.4 oz (71.2 kg)     There is no height or weight on file to calculate BMI.    EXAM:  GENERAL: This is a well-developed 58 y.o. male who appears his stated age  EYES: Grossly normal.  MOUTH: Buccal mucosa normal   CARDIAC:  NS1 S2, No Murmur  CHEST/LUNG:  Clear lung fields bilaterally   GASTROINTESINAL (ABDOMEN): Soft, non-tender, B/S present, no pulsatile mass  MUSCULOSKELETAL: Grossly normal and both lower extremities are intact.  HEME/LYMPH: No lymphedema  NEUROLOGIC: Focally intact, Alert and oriented x 3.   PSYCH: appropriate affect  INTEGUMENT: Right AKA.  Left great toe with gangrenous changes of the distal left great toe.  He has significant cyanosis of the left foot and rubor with dependency.  The patient has motor function in the left foot, but he gets around with a motorized wheel chair because of the right AKA.      Pulse Exam:     Radial: Left +2   Right  +2    Femoral: Left +1   Right  0     DP: Left 0       PT:   Left 0      Hand held doppler gets a very very weak monophasic signal in the left DP.           DIAGNOSTIC STUDIES:     Images:  Us Vein Mapping For Pre Bypass Graft Left    Result Date: 11/12/2018  Vein Mapping Ultrasound Lower Extremity Saphenous Veins Indication: Map left GSV for leg revascularization. Technique: Ultrasound of the Superficial Veins with Compression Maneuvers. Duplex Imaging of CFV is performed utilizing gray-scale, Two-dimensional images, color-flow imaging, Doppler waveform analysis, and Spectral doppler imaging done with provacative maneuvers. Location Left Greater Saphenous Vein (mm) Proximal Thigh 9.7 Mid Thigh 4.5 Distal Thigh 5.2 Knee 5.5 Proximal Calf 4.4 Distal Calf 3.1 Ankle 3.4 Comments: Bilateral CFV's patent Impression: No evidence of DVT in the left lower extremity. Greater saphenous vein appears to be of adequate size throughout the left lower extremity starting at 9.7 mm in the  proximal thigh and staying above 3 mm all the way to the left ankle. Joseph Harvey MD     Arterial Bypass Graft Leg Left    Result Date: 11/12/2018  Arterial Duplex Ultrasound Lower Extremity Bypass Evaluation INDICATION: status post left femoral-popliteal bypass graft declot.  COMPARISON: 08/25/2018 DATE OF BYPASS GRAFT/ANGIOGRAM/STENT PLACEMENT: 07/2018 LEFT LEG  TECHNIQUE: Duplex imaging is performed utilizing gray-scale,  two-dimensional images, and color-flow imaging. Doppler waveform analysis and spectral Doppler imaging is also performed. Left Leg (cm/s) Location Velocity  Waveforms EIA 64 m CFA 0 -  PFA 0 - SFA 0  - Inflow Artery CFA   Proximal Anastamosis 0 - Proximal Bypass 0 - Mid Bypass 0  - Distal Bypass 0 - Distal Anastamosis 0  - Outflow Artery POP A.   Popliteal Artery 0 - PTA 6 M  DPA 11 M  Waveforms: T=Triphasic, M=Monophasic, B=Biphasic  Comments OCCLUDED GRAFT. Impression: 1. LEFT LOWER EXTREMITY: Evidence of an occluded femoral to popliteal artery bypass graft and stent within the popliteal artery with monophasic flow noted in the distal posterior tibial and dorsalis pedis artery in the left foot. Joseph Harvey MD Reference: Category Normal Intermediate Severe Occluded  PSV  cm/s 151-300 cm/s <45 or >300cm/s Absent Flow Ratio <1.5 1.5-3.4 >3.4 N/A       LABS:      Sodium   Date Value Ref Range Status   08/27/2018 132 (L) 136 - 145 mmol/L Final   08/26/2018 129 (L) 136 - 145 mmol/L Final   08/25/2018 126 (L) 136 - 145 mmol/L Final     Potassium   Date Value Ref Range Status   08/27/2018 4.4 3.5 - 5.0 mmol/L Final   08/26/2018 4.5 3.5 - 5.0 mmol/L Final   08/25/2018 4.5 3.5 - 5.0 mmol/L Final     Chloride   Date Value Ref Range Status   08/27/2018 100 98 - 107 mmol/L Final   08/26/2018 102 98 - 107 mmol/L Final   08/25/2018 99 98 - 107 mmol/L Final     BUN   Date Value Ref Range Status   08/27/2018 17 8 - 22 mg/dL Final   08/26/2018 11 8 - 22 mg/dL Final   08/25/2018 11 8 - 22 mg/dL Final      Creatinine   Date Value Ref Range Status   08/27/2018 0.71 0.70 - 1.30 mg/dL Final   08/26/2018 0.69 (L) 0.70 - 1.30 mg/dL Final   08/25/2018 0.76 0.70 - 1.30 mg/dL Final     Hemoglobin   Date Value Ref Range Status   08/28/2018 10.9 (L) 14.0 - 18.0 g/dL Final   08/26/2018 11.9 (L) 14.0 - 18.0 g/dL Final   08/25/2018 12.2 (L) 14.0 - 18.0 g/dL Final     Platelets   Date Value Ref Range Status   08/28/2018 229 140 - 440 thou/uL Final   08/26/2018 208 140 - 440 thou/uL Final   08/25/2018 261 140 - 440 thou/uL Final     BNP   Date Value Ref Range Status   07/01/2018 105 (H) 0 - 48 pg/mL Final   11/06/2012 55 (H) <43 pg/mL Final   01/30/2011 34 <41 pg/mL Final     INR   Date Value Ref Range Status   08/28/2018 1.20 (H) 0.90 - 1.10 Final   08/27/2018 1.16 (H) 0.90 - 1.10 Final   08/26/2018 1.19 (H) 0.90 - 1.10 Final         Joseph Harvey MD  VASCULAR SURGERY

## 2021-06-21 NOTE — PROGRESS NOTES
Surgery/Procedure: left leg fempop bypass    Special Equipment: to be determined     Location: [facility]    Date: 11/26/2018    Time: 2:00pm     Surgeon: Dr. Harvey    Assist: to be determined     Length of Surgery: 240 min     OR Confirmed/ : augustina with 11/16/18   Orders In: No    Provider Team Notified:  Yes    Entered on [employer] Calendar:  Yes    Post Op: 12/10/2018 DR Harvey     Pre-op Instructions Reviewed with Nurse:  Darlyn

## 2021-06-23 NOTE — PROGRESS NOTES
"HPI: 58-year-old male who history of a right AKA and left femoropopliteal bypass who presented several months ago with left acute limb ischemia and rest pain and gangrenous first toe.  He presented then to [facility] and under basis and graft that was occluded and successful revascularization of the left limb.  He then proceeded undergo amputation of the left toe and this is successfully healed.  He no longer has rest pain in the left foot his ABIs today look reasonable at the ankle.    Allergies, Medications, Social History, Past Medical History and Past Surgical History were reviewed and are noted in the chart.    Review of Systems - Negative.    /70   Pulse 72   Temp 97.3  F (36.3  C) (Oral)   Resp 16   Ht 5' 8\" (1.727 m)   BMI 23.86 kg/m    Body mass index is 23.86 kg/m .    EXAM:  GENERAL: This is a well developed male in no distress.  In a motorized scooter.  HEAD AND NECK: Cranial nerves intact. No neck masses or bruits.  CHEST/LUNG: Clear  GROINS: Both femoral pulse palpable.  EXTREM: Right AKA stump is unchanged.   Left lower leg 1st toe amputation site is well healed.  The left lower leg has old scars from fasciotomy sites.  The foot is warm and pink with good perfusion.     IMAGES:     CHRISTA is 0.96 at the ankle on the right and markedly improved since thrombolysis.       Assessment/Plan: 58-year-old male with a history of a left femoropopliteal recanalization thrombolyzed in the left lower extremity with a left great toe amputation for critical limb ischemia.    He is currently on Coumadin and a baby aspirin daily.  I have asked him to continue these medications indefinitely.  He will follow-up with me in 3 months with a repeat left lower extremity CHRISTA as well as a left to assess the bypass graft.  Patient will call sooner if he develops any issues in the meantime.    Total time spent 15 minutes face to face with patient with more than 50% time spent in counseling and coordination of care.     Joseph " MD Wes  Vascular Surgery

## 2021-06-23 NOTE — PROGRESS NOTES
Bypass surgery was cancelled due to the pt being at [facility]. He had a first digit amputation of his left toe. s/p LLE angiogram on 11/19 and 11/20 with angioplasty of L CFA and popiteal stents and occulded TPT and stenotic proximal ORA, catheter directed thrombolysis resulting in successful re-vascularixation of the SFA with 2 vessel run off to the L calf and foot. Completed hyperbaric therapy last Monday. PVD, HTN, AFIB, DM2, alcohol abuse. ASA, statin, insulin, coumadin.

## 2021-06-26 NOTE — PROGRESS NOTES
Progress Notes by Paige Watson CNP at 8/10/2018  8:30 AM     Author: Paige Watson CNP Service: -- Author Type: Nurse Practitioner    Filed: 8/10/2018  9:59 AM Encounter Date: 8/10/2018 Status: Signed    : Paige Watson CNP (Nurse Practitioner)           Click to link to [facility]     [facility] HEART [facility] ELECTROPHYSIOLOGY NOTE      Assessment/Recommendations   Assessment/Plan:    1.  Atrial fibrillation: He had one brief episode of atrial fibrillation on postop day 2 after CABG.  No symptomatology or evidence of recurrence.  We had a lengthy discussion of the physiology and natural progression of atrial fibrillation, though this is likely a lone episode occurring in the postoperative setting.  Continue amiodarone 200 mg daily for a total of 3 months following surgery, then discontinue.  He was reassured that atrial fibrillation is not life-threatening, but carries an increased risk for stroke.  He has an elevated [id number] score.  He is on long-term anticoagulation with warfarin for stroke prophylaxis due to severe PVD with previous DVT and graft occlusion.    2.  Coronary artery disease: Status post two-vessel CABG on 7/6/2018.  He continues to have chest discomfort that does not appear to be anginal.    3.  Hypertension: Blood pressure at target today.    4.  Nicotine dependence: Discussed correlation between smoking and significant vascular disease.  He was strongly encouraged to quit.    Follow-up with CV surgery later this morning.  Follow up Dr. Cisse on 8/24/2018 as previously scheduled.  EP follow-up if needed for recurrence of A. fib.     History of Present Illness    Mr. Placido Hinton is a very pleasant 58 y.o. male who comes in today for EP consultation of atrial fibrillation.  He has a history of type 2 diabetes, diastolic heart failure, peripheral vascular disease status post right above-the-knee amputation and left leg nonhealing wounds, peripheral neuropathy,  hypertension, tobacco abuse, alcohol abuse, and coronary artery disease.  On 7/6/2018, he underwent CABG ×2 vessels, LIMA to LAD and right radial artery to OM with ADRIAN Saini reinforcement of the sternum bilaterally.  He had a brief episode of atrial fibrillation on postop day 2 for which he was started on amiodarone.  There is no further mention or documentation of A. fib.  He is on long-term anticoagulation with warfarin due to severe PVD and previous graft occlusion after femoropopliteal bypass.    Wei continues to have significant pain from various sites in his chest and shoulder.  He has not had any dramatic episodes of A. fib.  He denies palpitations, abdominal fullness/bloating or peripheral edema, shortness of breath, paroxysmal nocturnal dyspnea, orthopnea, lightheadedness, dizziness, pre-syncope, or syncope.    Cardiographics (personally reviewed):  EKG, Done 7/8/2018 shows sinus rhythm at 97 bpm with sinus arrhythmia and occasional PVCs.    Echo done 7/4/2018:    Normal left ventricular size.    Left ventricular ejection fraction is normal. The calculated left ventricular ejection fraction is 61%.    Normal right ventricular size and systolic function.    No hemodynamically significant valvular heart abnormalities.    When compared to the previous study dated 1/30/2016, no significant change.       Physical Examination Review of Systems   Vitals:    08/10/18 0821   BP: 118/70   Pulse: 68   Resp: 8     There is no height or weight on file to calculate BMI.  Wt Readings from Last 3 Encounters:   07/11/18 154 lb 12.8 oz (70.2 kg)   08/24/16 138 lb 3.2 oz (62.7 kg)   07/30/16 139 lb 12.8 oz (63.4 kg)     General Appearance:   Alert, well-appearing and in no acute distress.   HEENT: Atraumatic, normocephalic.  No scleral icterus, normal conjunctivae, EOM intact; mucous membranes pink and moist.    Chest: Chest symmetric, spine straight.  Median sternotomy well-healed   Lungs:   Respirations unlabored: Lungs  are clear to auscultation.   Cardiovascular:   Normal first and second heart sounds with no murmurs, rubs, or gallops.  Regular rate and rhythm.  Left radial pulse intact, no lower extremity edema.   Abdomen:  Soft, nontender, nondistended, bowel sounds present   Extremities: No cyanosis or clubbing (s/p right above-the-knee amputation)   Musculoskeletal: Moves all extremities   Skin: Warm, dry, intact.    Neurologic: Mood and affect are appropriate, alert and oriented to person, place, time, and situation    General: WNL  Eyes: WNL  Ears/Nose/Throat: WNL  Lungs: WNL  Heart: Chest Pain  Stomach: WNL  Bladder: WNL  Muscle/Joints: WNL  Skin: WNL  Nervous System: WNL  Mental Health: WNL     Blood: WNL     Medical History  Surgical History Family History Social History   Past Medical History:   Diagnosis Date   ? Alcohol abuse     with history of alcohol induced pancreatitis   ? Anxiety    ? Asthma    ? Bipolar 1 disorder (H)    ? Chronic back pain    ? Chronic diastolic heart failure (H) 10/13/2015   ? Compartment syndrome (H)    ? DVT (deep venous thrombosis) (H)    ? Essential hypertension     Created by Conversion  Replacement Utility updated for latest IMO load   ? GERD (gastroesophageal reflux disease)    ? GI bleeding     secondary to erosive esophagitis   ? Gout    ? Hyperlipidemia    ? Insomnia    ? Neuropathy (H)    ? Peripheral vascular disease (H)     s/p left femoral bypass in 2009   ? TBI (traumatic brain injury) (H) 1978   ? Type 2 diabetes mellitus with diabetic polyneuropathy (H)     Created by Conversion    ? Unilateral AKA, right (H) 10/13/2015   ? Vascular graft occlusion (H) 4/1/2015    Past Surgical History:   Procedure Laterality Date   ? ABOVE KNEE LEG AMPUTATION Right 4/10/2015    Procedure: RIGHT LEG FASCIOTOMY ;  Surgeon: Niles Spaulding MD;  Location: [facility];  Service:    ? BELOW KNEE LEG AMPUTATION Right 4/14/2015    Procedure:  RIGHT - AMPUTATION BELOW KNEE;  Surgeon: Niles STEELE  MD Jasson;  Location: [facility] Main OR;  Service:    ? COLONOSCOPY N/A 2/1/2016    Procedure: COLONOSCOPY;  Surgeon: Alex Peña MD;  Location: [facility] GI;  Service:    ? CV CORONARY ANGIOGRAM N/A 7/3/2018    Procedure: Coronary Angiogram;  Surgeon: Isai Salomon MD;  Location: [facility] Cath Lab;  Service:    ? ESOPHAGOGASTRODUODENOSCOPY N/A 1/11/2016    Procedure: ESOPHAGOGASTRODUODENOSCOPY;  Surgeon: Joshua Sibley MD;  Location: [facility] GI;  Service:    ? FASCIOTOMY      left leg   ? PA CABG, ARTERY-VEIN, THREE N/A 7/6/2018    Procedure: CORONARY ARTERY BYPASS GRAFT X2, RIGHT RADIAL ENDOSCOPTIC VEIN HARVEST, ANESTHESIA, TRANSESOPHAGEAL ECHO ;  Surgeon: Luda Allred MD;  Location: [facility] Main OR;  Service: Cardiovascular   ? PA VEIN BYPASS GRAFT,FEM-POP      Description: Bypass Graft Using Vein: Femoral-popliteal;  Recorded: 12/26/2012;   ? ROTATOR CUFF REPAIR Right    ? TUMOR REMOVAL      Neck    Family History   Problem Relation Age of Onset   ? Alcohol abuse Mother    ? Diabetes Brother    ? CABG Father     Social History     Social History   ? Marital status: Single     Spouse name: N/A   ? Number of children: 0   ? Years of education: N/A     Occupational History   ?  Disabled     Social History Main Topics   ? Smoking status: Current Every Day Smoker     Packs/day: 0.50     Years: 40.00     Types: Cigarettes   ? Smokeless tobacco: Never Used   ? Alcohol use 12.0 oz/week     20 Shots of liquor per week      Comment: 3 Liquor beverages a day - History of alcohol abuse, alcoholic pancreatitis   ? Drug use: No   ? Sexual activity: Not on file     Other Topics Concern   ? Not on file     Social History Narrative          Medications  Allergies   Current Outpatient Prescriptions   Medication Sig Dispense Refill   ? acetaminophen (TYLENOL) 325 MG tablet Take 2 tablets (650 mg total) by mouth every 4 (four) hours as needed for pain or fever.  0   ? amiodarone (PACERONE)  200 MG tablet Take 1 tablet (200 mg total) by mouth daily. 30 tablet 1   ? ascorbic acid (VITAMIN C) 500 MG tablet Take 500 mg by mouth 2 (two) times a day.     ? aspirin 81 MG EC tablet Take 81 mg by mouth daily.     ? atorvastatin (LIPITOR) 80 MG tablet Take 80 mg by mouth bedtime.     ? b complex vitamins tablet Take 1 tablet by mouth bedtime.      ? blood sugar diagnostic (GLUCOSE BLOOD) Strp Simran Contour Test In Vitro Strip test 3 times daily     ? buPROPion (WELLBUTRIN XL) 150 MG 24 hr tablet Take 150 mg by mouth daily.     ? citalopram (CELEXA) 20 MG tablet Take 20 mg by mouth daily.     ? docusate sodium (COLACE) 100 MG capsule Take 100 mg by mouth daily.     ? DULoxetine (CYMBALTA) 60 MG capsule Take 60 mg by mouth daily.     ? ergocalciferol (ERGOCALCIFEROL) 50,000 unit capsule Take 50,000 Units by mouth once a week.     ? gabapentin (NEURONTIN) 600 MG tablet Take 600 mg by mouth 3 (three) times a day.     ? insulin aspart U-100 (NOVOLOG) 100 unit/mL injection Inject 20 Units under the skin 3 (three) times a day before meals.     ? insulin glargine (LANTUS) 100 unit/mL injection Inject 20 Units under the skin at bedtime.      ? LANCETS MISC Use As Directed.     ? magnesium oxide (MAG-OX) 400 mg tablet Take 1,200 mg by mouth 2 (two) times a day.      ? metoprolol tartrate (LOPRESSOR) 25 MG tablet Take 1 tablet (25 mg total) by mouth 2 (two) times a day. 60 tablet 3   ? nitroglycerin (NITROSTAT) 0.4 MG SL tablet Place 0.4 mg under the tongue every 5 (five) minutes as needed for chest pain.     ? nortriptyline (PAMELOR) 25 MG capsule Take 50 mg by mouth at bedtime.      ? oxyCODONE (ROXICODONE) 5 MG immediate release tablet Take 0.5-1 tablets (2.5-5 mg total) by mouth every 8 (eight) hours as needed for pain. 30 tablet 0   ? polyethylene glycol (MIRALAX) 17 gram packet Take 1 packet (17 g total) by mouth daily as needed.  0   ? tamsulosin (FLOMAX) 0.4 mg Cp24 Take 0.4 mg by mouth Daily after breakfast.     ?  therapeutic multivitamin (THERAGRAN) tablet Take 1 tablet by mouth every evening.      ? warfarin (COUMADIN) 2 MG tablet Check INR daily as need to regulate INR, starting 7/12/2018 and report to PCP for instructions on Coumadin dosing, To Target INR 2-3 for H/o clot in graft for PVD. 30 tablet 3   ? diltiazem (CARDIZEM) 30 MG tablet Take 0.5 tablets (15 mg total) by mouth 4 (four) times a day for 30 doses. 60 tablet 2     No current facility-administered medications for this visit.       Allergies   Allergen Reactions   ? Penicillins Anaphylaxis      Medical, surgical, family, social history, and medications were all reviewed and updated as necessary.   Lab Results    Chemistry CBC Other   Lab Results   Component Value Date    CREATININE 0.73 07/09/2018    BUN 13 07/09/2018     (L) 07/09/2018    K 4.5 07/11/2018    CL 97 (L) 07/09/2018    CO2 23 07/09/2018     Creatinine (mg/dL)   Date Value   07/09/2018 0.73   07/08/2018 0.63 (L)   07/07/2018 0.59 (L)   07/06/2018 0.60 (L)    Lab Results   Component Value Date    WBC 11.8 (H) 07/09/2018    HGB 9.3 (L) 07/11/2018    HCT 22.2 (L) 07/09/2018    MCV 96 07/09/2018     07/10/2018    Lab Results   Component Value Date    INR 2.04 (H) 07/11/2018    INR 1.87 (H) 07/10/2018    INR 1.55 (H) 07/09/2018     Lab Results   Component Value Date    TSH 3.0 06/11/2013            Greater than than 40 minutes were spent face to face in this visit with more than 50% spent discussing diagnoses as listed above, counseling, and coordination of care.    This note has been dictated using voice recognition software. Any grammatical, typographical, or context distortions are unintentional and inherent to the software.    Paige Watson, [facility]   Electrophysiology

## 2021-06-26 NOTE — PROGRESS NOTES
Progress Notes by Vinita Chandler RN at 11/12/2018  2:00 PM     Author: Vinita Chandler RN Service: -- Author Type: Registered Nurse    Filed: 11/13/2018  9:10 AM Encounter Date: 11/12/2018 Status: Addendum    : Viniat Chandler RN (Registered Nurse)    Related Notes: Original Note by Vinita Chandler RN (Registered Nurse) filed at 11/12/2018  4:49 PM         L heel wound      L 5th digit wound       L great toe wound    DR. JEWEL TERRAZAS REFERRING FROM THE [facility]. Necrotic distal L great toe. Prescribed oxycodone for pain. Rating pain 8/10 today.  PVD, HTN, HF, DM2. ASA, statin, insulin, coumadin.

## 2021-07-14 PROBLEM — Z95.1 S/P CABG X 3: Status: RESOLVED | Noted: 2018-07-01 | Resolved: 2018-08-24

## 2021-07-14 PROBLEM — I25.119 ATHEROSCLEROSIS OF NATIVE CORONARY ARTERY OF NATIVE HEART WITH ANGINA PECTORIS (H): Status: RESOLVED | Noted: 2018-07-01 | Resolved: 2018-08-24
